# Patient Record
Sex: FEMALE | Race: WHITE | NOT HISPANIC OR LATINO | Employment: OTHER | URBAN - METROPOLITAN AREA
[De-identification: names, ages, dates, MRNs, and addresses within clinical notes are randomized per-mention and may not be internally consistent; named-entity substitution may affect disease eponyms.]

---

## 2019-08-19 ENCOUNTER — HOSPITAL ENCOUNTER (INPATIENT)
Facility: MEDICAL CENTER | Age: 69
LOS: 1 days | DRG: 247 | End: 2019-08-21
Attending: EMERGENCY MEDICINE | Admitting: INTERNAL MEDICINE
Payer: MEDICARE

## 2019-08-19 ENCOUNTER — APPOINTMENT (OUTPATIENT)
Dept: RADIOLOGY | Facility: MEDICAL CENTER | Age: 69
DRG: 247 | End: 2019-08-19
Attending: EMERGENCY MEDICINE
Payer: MEDICARE

## 2019-08-19 ENCOUNTER — APPOINTMENT (OUTPATIENT)
Dept: RADIOLOGY | Facility: MEDICAL CENTER | Age: 69
DRG: 247 | End: 2019-08-19
Payer: MEDICARE

## 2019-08-19 DIAGNOSIS — I25.10 CORONARY ARTERY DISEASE INVOLVING NATIVE CORONARY ARTERY OF NATIVE HEART, ANGINA PRESENCE UNSPECIFIED: ICD-10-CM

## 2019-08-19 DIAGNOSIS — I21.4 NSTEMI (NON-ST ELEVATED MYOCARDIAL INFARCTION) (HCC): ICD-10-CM

## 2019-08-19 DIAGNOSIS — I20.0 UNSTABLE ANGINA PECTORIS (HCC): ICD-10-CM

## 2019-08-19 PROBLEM — R71.8 HIGH HEMATOCRIT: Status: ACTIVE | Noted: 2019-08-19

## 2019-08-19 PROBLEM — F17.200 TOBACCO USE DISORDER: Status: ACTIVE | Noted: 2019-08-19

## 2019-08-19 PROBLEM — R07.9 CHEST PAIN: Status: ACTIVE | Noted: 2019-08-19

## 2019-08-19 PROBLEM — R79.89 ELEVATED TROPONIN LEVEL: Status: ACTIVE | Noted: 2019-08-19

## 2019-08-19 LAB
ALBUMIN SERPL BCP-MCNC: 4.7 G/DL (ref 3.2–4.9)
ALBUMIN/GLOB SERPL: 1.7 G/DL
ALP SERPL-CCNC: 58 U/L (ref 30–99)
ALT SERPL-CCNC: 21 U/L (ref 2–50)
ANION GAP SERPL CALC-SCNC: 9 MMOL/L (ref 0–11.9)
AST SERPL-CCNC: 18 U/L (ref 12–45)
BASOPHILS # BLD AUTO: 0.4 % (ref 0–1.8)
BASOPHILS # BLD: 0.03 K/UL (ref 0–0.12)
BILIRUB SERPL-MCNC: 0.8 MG/DL (ref 0.1–1.5)
BUN SERPL-MCNC: 13 MG/DL (ref 8–22)
CALCIUM SERPL-MCNC: 10.1 MG/DL (ref 8.5–10.5)
CHLORIDE SERPL-SCNC: 106 MMOL/L (ref 96–112)
CO2 SERPL-SCNC: 27 MMOL/L (ref 20–33)
CREAT SERPL-MCNC: 0.84 MG/DL (ref 0.5–1.4)
EKG IMPRESSION: NORMAL
EOSINOPHIL # BLD AUTO: 0.08 K/UL (ref 0–0.51)
EOSINOPHIL NFR BLD: 1 % (ref 0–6.9)
ERYTHROCYTE [DISTWIDTH] IN BLOOD BY AUTOMATED COUNT: 42.6 FL (ref 35.9–50)
GLOBULIN SER CALC-MCNC: 2.7 G/DL (ref 1.9–3.5)
GLUCOSE SERPL-MCNC: 93 MG/DL (ref 65–99)
HCT VFR BLD AUTO: 48.4 % (ref 37–47)
HGB BLD-MCNC: 15.5 G/DL (ref 12–16)
IMM GRANULOCYTES # BLD AUTO: 0.02 K/UL (ref 0–0.11)
IMM GRANULOCYTES NFR BLD AUTO: 0.2 % (ref 0–0.9)
LYMPHOCYTES # BLD AUTO: 2.78 K/UL (ref 1–4.8)
LYMPHOCYTES NFR BLD: 33.6 % (ref 22–41)
MCH RBC QN AUTO: 28.3 PG (ref 27–33)
MCHC RBC AUTO-ENTMCNC: 32 G/DL (ref 33.6–35)
MCV RBC AUTO: 88.3 FL (ref 81.4–97.8)
MONOCYTES # BLD AUTO: 0.44 K/UL (ref 0–0.85)
MONOCYTES NFR BLD AUTO: 5.3 % (ref 0–13.4)
NEUTROPHILS # BLD AUTO: 4.92 K/UL (ref 2–7.15)
NEUTROPHILS NFR BLD: 59.5 % (ref 44–72)
NRBC # BLD AUTO: 0 K/UL
NRBC BLD-RTO: 0 /100 WBC
PLATELET # BLD AUTO: 241 K/UL (ref 164–446)
PMV BLD AUTO: 9.7 FL (ref 9–12.9)
POTASSIUM SERPL-SCNC: 3.8 MMOL/L (ref 3.6–5.5)
PROT SERPL-MCNC: 7.4 G/DL (ref 6–8.2)
RBC # BLD AUTO: 5.48 M/UL (ref 4.2–5.4)
SODIUM SERPL-SCNC: 142 MMOL/L (ref 135–145)
TROPONIN T SERPL-MCNC: 48 NG/L (ref 6–19)
TROPONIN T SERPL-MCNC: <6 NG/L (ref 6–19)
WBC # BLD AUTO: 8.3 K/UL (ref 4.8–10.8)

## 2019-08-19 PROCEDURE — 93005 ELECTROCARDIOGRAM TRACING: CPT | Performed by: STUDENT IN AN ORGANIZED HEALTH CARE EDUCATION/TRAINING PROGRAM

## 2019-08-19 PROCEDURE — 96374 THER/PROPH/DIAG INJ IV PUSH: CPT

## 2019-08-19 PROCEDURE — 80053 COMPREHEN METABOLIC PANEL: CPT

## 2019-08-19 PROCEDURE — G0378 HOSPITAL OBSERVATION PER HR: HCPCS

## 2019-08-19 PROCEDURE — 93005 ELECTROCARDIOGRAM TRACING: CPT | Performed by: EMERGENCY MEDICINE

## 2019-08-19 PROCEDURE — 84484 ASSAY OF TROPONIN QUANT: CPT | Mod: 91

## 2019-08-19 PROCEDURE — 700111 HCHG RX REV CODE 636 W/ 250 OVERRIDE (IP): Performed by: STUDENT IN AN ORGANIZED HEALTH CARE EDUCATION/TRAINING PROGRAM

## 2019-08-19 PROCEDURE — 85025 COMPLETE CBC W/AUTO DIFF WBC: CPT

## 2019-08-19 PROCEDURE — 71045 X-RAY EXAM CHEST 1 VIEW: CPT

## 2019-08-19 PROCEDURE — 96372 THER/PROPH/DIAG INJ SC/IM: CPT

## 2019-08-19 PROCEDURE — 700102 HCHG RX REV CODE 250 W/ 637 OVERRIDE(OP): Performed by: STUDENT IN AN ORGANIZED HEALTH CARE EDUCATION/TRAINING PROGRAM

## 2019-08-19 PROCEDURE — 93005 ELECTROCARDIOGRAM TRACING: CPT

## 2019-08-19 PROCEDURE — 36415 COLL VENOUS BLD VENIPUNCTURE: CPT

## 2019-08-19 PROCEDURE — 700102 HCHG RX REV CODE 250 W/ 637 OVERRIDE(OP): Performed by: EMERGENCY MEDICINE

## 2019-08-19 PROCEDURE — 99285 EMERGENCY DEPT VISIT HI MDM: CPT

## 2019-08-19 PROCEDURE — 700111 HCHG RX REV CODE 636 W/ 250 OVERRIDE (IP): Performed by: EMERGENCY MEDICINE

## 2019-08-19 PROCEDURE — 93010 ELECTROCARDIOGRAM REPORT: CPT | Performed by: INTERNAL MEDICINE

## 2019-08-19 PROCEDURE — A9270 NON-COVERED ITEM OR SERVICE: HCPCS | Performed by: EMERGENCY MEDICINE

## 2019-08-19 PROCEDURE — A9270 NON-COVERED ITEM OR SERVICE: HCPCS | Performed by: STUDENT IN AN ORGANIZED HEALTH CARE EDUCATION/TRAINING PROGRAM

## 2019-08-19 RX ORDER — ACETAMINOPHEN 500 MG
500 TABLET ORAL EVERY 6 HOURS PRN
Status: DISCONTINUED | OUTPATIENT
Start: 2019-08-19 | End: 2019-08-21 | Stop reason: HOSPADM

## 2019-08-19 RX ORDER — POLYETHYLENE GLYCOL 3350 17 G/17G
1 POWDER, FOR SOLUTION ORAL
Status: DISCONTINUED | OUTPATIENT
Start: 2019-08-19 | End: 2019-08-21 | Stop reason: HOSPADM

## 2019-08-19 RX ORDER — NITROGLYCERIN 0.4 MG/1
0.4 TABLET SUBLINGUAL
COMMUNITY

## 2019-08-19 RX ORDER — LOSARTAN POTASSIUM AND HYDROCHLOROTHIAZIDE 12.5; 1 MG/1; MG/1
1 TABLET ORAL DAILY
Status: DISCONTINUED | OUTPATIENT
Start: 2019-08-20 | End: 2019-08-19

## 2019-08-19 RX ORDER — AMOXICILLIN 250 MG
2 CAPSULE ORAL 2 TIMES DAILY
Status: DISCONTINUED | OUTPATIENT
Start: 2019-08-19 | End: 2019-08-21 | Stop reason: HOSPADM

## 2019-08-19 RX ORDER — ONDANSETRON 2 MG/ML
4 INJECTION INTRAMUSCULAR; INTRAVENOUS ONCE
Status: COMPLETED | OUTPATIENT
Start: 2019-08-19 | End: 2019-08-19

## 2019-08-19 RX ORDER — BISACODYL 10 MG
10 SUPPOSITORY, RECTAL RECTAL
Status: DISCONTINUED | OUTPATIENT
Start: 2019-08-19 | End: 2019-08-21 | Stop reason: HOSPADM

## 2019-08-19 RX ORDER — ROSUVASTATIN CALCIUM 20 MG/1
20 TABLET, COATED ORAL EVERY EVENING
COMMUNITY

## 2019-08-19 RX ORDER — HYDROCHLOROTHIAZIDE 25 MG/1
12.5 TABLET ORAL
Status: DISCONTINUED | OUTPATIENT
Start: 2019-08-20 | End: 2019-08-21 | Stop reason: HOSPADM

## 2019-08-19 RX ORDER — LOSARTAN POTASSIUM AND HYDROCHLOROTHIAZIDE 12.5; 1 MG/1; MG/1
1 TABLET ORAL DAILY
COMMUNITY

## 2019-08-19 RX ORDER — LABETALOL HYDROCHLORIDE 5 MG/ML
10 INJECTION, SOLUTION INTRAVENOUS EVERY 4 HOURS PRN
Status: DISCONTINUED | OUTPATIENT
Start: 2019-08-19 | End: 2019-08-21 | Stop reason: HOSPADM

## 2019-08-19 RX ORDER — MULTIVITAMIN WITH IRON
250 TABLET ORAL
COMMUNITY

## 2019-08-19 RX ORDER — ROSUVASTATIN CALCIUM 20 MG/1
20 TABLET, COATED ORAL EVERY EVENING
Status: DISCONTINUED | OUTPATIENT
Start: 2019-08-19 | End: 2019-08-21 | Stop reason: HOSPADM

## 2019-08-19 RX ORDER — LOSARTAN POTASSIUM 50 MG/1
100 TABLET ORAL
Status: DISCONTINUED | OUTPATIENT
Start: 2019-08-20 | End: 2019-08-21 | Stop reason: HOSPADM

## 2019-08-19 RX ORDER — METOPROLOL SUCCINATE 25 MG/1
12.5 TABLET, EXTENDED RELEASE ORAL 2 TIMES DAILY
COMMUNITY
End: 2019-08-19

## 2019-08-19 RX ADMIN — ONDANSETRON 4 MG: 2 INJECTION INTRAMUSCULAR; INTRAVENOUS at 18:42

## 2019-08-19 RX ADMIN — NITROGLYCERIN 1 INCH: 20 OINTMENT TOPICAL at 18:42

## 2019-08-19 RX ADMIN — ROSUVASTATIN CALCIUM 20 MG: 20 TABLET, FILM COATED ORAL at 21:03

## 2019-08-19 RX ADMIN — ENOXAPARIN SODIUM 100 MG: 100 INJECTION SUBCUTANEOUS at 23:23

## 2019-08-19 RX ADMIN — METOPROLOL TARTRATE 12.5 MG: 25 TABLET ORAL at 21:03

## 2019-08-19 ASSESSMENT — LIFESTYLE VARIABLES
HOW MANY TIMES IN THE PAST YEAR HAVE YOU HAD 5 OR MORE DRINKS IN A DAY: 0
AVERAGE NUMBER OF DAYS PER WEEK YOU HAVE A DRINK CONTAINING ALCOHOL: 0
EVER HAD A DRINK FIRST THING IN THE MORNING TO STEADY YOUR NERVES TO GET RID OF A HANGOVER: NO
EVER FELT BAD OR GUILTY ABOUT YOUR DRINKING: NO
ALCOHOL_USE: NO
HAVE YOU EVER FELT YOU SHOULD CUT DOWN ON YOUR DRINKING: NO
CONSUMPTION TOTAL: NEGATIVE
TOTAL SCORE: 0
DOES PATIENT WANT TO STOP DRINKING: NO
DO YOU DRINK ALCOHOL: NO
TOTAL SCORE: 0
TOTAL SCORE: 0
ON A TYPICAL DAY WHEN YOU DRINK ALCOHOL HOW MANY DRINKS DO YOU HAVE: 0
HAVE PEOPLE ANNOYED YOU BY CRITICIZING YOUR DRINKING: NO
EVER_SMOKED: YES

## 2019-08-19 ASSESSMENT — ENCOUNTER SYMPTOMS
CLAUDICATION: 0
PSYCHIATRIC NEGATIVE: 1
RESPIRATORY NEGATIVE: 1
CONSTITUTIONAL NEGATIVE: 1
MUSCULOSKELETAL NEGATIVE: 1
PND: 0
NEUROLOGICAL NEGATIVE: 1
GASTROINTESTINAL NEGATIVE: 1
ORTHOPNEA: 0

## 2019-08-19 ASSESSMENT — PATIENT HEALTH QUESTIONNAIRE - PHQ9
SUM OF ALL RESPONSES TO PHQ9 QUESTIONS 1 AND 2: 0
2. FEELING DOWN, DEPRESSED, IRRITABLE, OR HOPELESS: NOT AT ALL
1. LITTLE INTEREST OR PLEASURE IN DOING THINGS: NOT AT ALL

## 2019-08-19 ASSESSMENT — PAIN SCALES - WONG BAKER
WONGBAKER_NUMERICALRESPONSE: HURTS JUST A LITTLE BIT
WONGBAKER_NUMERICALRESPONSE: DOESN'T HURT AT ALL

## 2019-08-19 NOTE — ED TRIAGE NOTES
"Chief Complaint   Patient presents with   • Chest Pain     \"I am having a heart attack, I know I am\". yesterday at 0530 am developed LT sided chest pain raidating down both arms.      Pt to triage for above. Pt initially crying, consoled pt. Now calmed down. Protocol ordered.  Took 81mg ASA this am and NTG x2 PTA.    Educated on triage process and to inform staff of any changes.     BP (!) 187/94   Pulse 71   Temp 36.6 °C (97.9 °F) (Temporal)   Resp 18   Ht 1.753 m (5' 9\")   Wt 105.3 kg (232 lb 2.3 oz)   SpO2 98%   BMI 34.28 kg/m²     "

## 2019-08-19 NOTE — ED NOTES
V/s reassessed.  Pt back to waiting room.  Pt instructed to inform RN if any changes or questions arise.

## 2019-08-20 ENCOUNTER — APPOINTMENT (OUTPATIENT)
Dept: CARDIOLOGY | Facility: MEDICAL CENTER | Age: 69
DRG: 247 | End: 2019-08-20
Attending: INTERNAL MEDICINE
Payer: MEDICARE

## 2019-08-20 ENCOUNTER — PATIENT OUTREACH (OUTPATIENT)
Dept: HEALTH INFORMATION MANAGEMENT | Facility: OTHER | Age: 69
End: 2019-08-20

## 2019-08-20 PROBLEM — I21.4 NSTEMI (NON-ST ELEVATED MYOCARDIAL INFARCTION) (HCC): Status: ACTIVE | Noted: 2019-08-19

## 2019-08-20 PROBLEM — I10 HYPERTENSION: Status: ACTIVE | Noted: 2019-08-20

## 2019-08-20 LAB
ACT BLD: 241 SEC (ref 74–137)
ACT BLD: 241 SEC (ref 74–137)
ALBUMIN SERPL BCP-MCNC: 3.8 G/DL (ref 3.2–4.9)
ALBUMIN/GLOB SERPL: 1.7 G/DL
ALP SERPL-CCNC: 51 U/L (ref 30–99)
ALT SERPL-CCNC: 17 U/L (ref 2–50)
ANION GAP SERPL CALC-SCNC: 7 MMOL/L (ref 0–11.9)
AST SERPL-CCNC: 22 U/L (ref 12–45)
BASOPHILS # BLD AUTO: 0.3 % (ref 0–1.8)
BASOPHILS # BLD: 0.02 K/UL (ref 0–0.12)
BILIRUB SERPL-MCNC: 0.8 MG/DL (ref 0.1–1.5)
BUN SERPL-MCNC: 15 MG/DL (ref 8–22)
CALCIUM SERPL-MCNC: 9.3 MG/DL (ref 8.5–10.5)
CHLORIDE SERPL-SCNC: 107 MMOL/L (ref 96–112)
CHOLEST SERPL-MCNC: 112 MG/DL (ref 100–199)
CO2 SERPL-SCNC: 28 MMOL/L (ref 20–33)
CREAT SERPL-MCNC: 0.8 MG/DL (ref 0.5–1.4)
EKG IMPRESSION: NORMAL
EOSINOPHIL # BLD AUTO: 0.13 K/UL (ref 0–0.51)
EOSINOPHIL NFR BLD: 2 % (ref 0–6.9)
ERYTHROCYTE [DISTWIDTH] IN BLOOD BY AUTOMATED COUNT: 42.9 FL (ref 35.9–50)
GLOBULIN SER CALC-MCNC: 2.3 G/DL (ref 1.9–3.5)
GLUCOSE SERPL-MCNC: 88 MG/DL (ref 65–99)
HCT VFR BLD AUTO: 43.4 % (ref 37–47)
HDLC SERPL-MCNC: 46 MG/DL
HGB BLD-MCNC: 13.8 G/DL (ref 12–16)
IMM GRANULOCYTES # BLD AUTO: 0.02 K/UL (ref 0–0.11)
IMM GRANULOCYTES NFR BLD AUTO: 0.3 % (ref 0–0.9)
LDLC SERPL CALC-MCNC: 56 MG/DL
LYMPHOCYTES # BLD AUTO: 2.89 K/UL (ref 1–4.8)
LYMPHOCYTES NFR BLD: 44.3 % (ref 22–41)
MAGNESIUM SERPL-MCNC: 2.1 MG/DL (ref 1.5–2.5)
MCH RBC QN AUTO: 28.5 PG (ref 27–33)
MCHC RBC AUTO-ENTMCNC: 31.8 G/DL (ref 33.6–35)
MCV RBC AUTO: 89.5 FL (ref 81.4–97.8)
MONOCYTES # BLD AUTO: 0.51 K/UL (ref 0–0.85)
MONOCYTES NFR BLD AUTO: 7.8 % (ref 0–13.4)
NEUTROPHILS # BLD AUTO: 2.95 K/UL (ref 2–7.15)
NEUTROPHILS NFR BLD: 45.3 % (ref 44–72)
NRBC # BLD AUTO: 0 K/UL
NRBC BLD-RTO: 0 /100 WBC
PHOSPHATE SERPL-MCNC: 3.6 MG/DL (ref 2.5–4.5)
PLATELET # BLD AUTO: 206 K/UL (ref 164–446)
PMV BLD AUTO: 9.8 FL (ref 9–12.9)
POTASSIUM SERPL-SCNC: 3.6 MMOL/L (ref 3.6–5.5)
PROT SERPL-MCNC: 6.1 G/DL (ref 6–8.2)
RBC # BLD AUTO: 4.85 M/UL (ref 4.2–5.4)
SODIUM SERPL-SCNC: 142 MMOL/L (ref 135–145)
TRIGL SERPL-MCNC: 48 MG/DL (ref 0–149)
TROPONIN T SERPL-MCNC: 164 NG/L (ref 6–19)
WBC # BLD AUTO: 6.5 K/UL (ref 4.8–10.8)

## 2019-08-20 PROCEDURE — 027034Z DILATION OF CORONARY ARTERY, ONE ARTERY WITH DRUG-ELUTING INTRALUMINAL DEVICE, PERCUTANEOUS APPROACH: ICD-10-PCS | Performed by: INTERNAL MEDICINE

## 2019-08-20 PROCEDURE — 84484 ASSAY OF TROPONIN QUANT: CPT

## 2019-08-20 PROCEDURE — 700105 HCHG RX REV CODE 258: Performed by: INTERNAL MEDICINE

## 2019-08-20 PROCEDURE — 36415 COLL VENOUS BLD VENIPUNCTURE: CPT

## 2019-08-20 PROCEDURE — 99223 1ST HOSP IP/OBS HIGH 75: CPT | Mod: 25 | Performed by: INTERNAL MEDICINE

## 2019-08-20 PROCEDURE — 93458 L HRT ARTERY/VENTRICLE ANGIO: CPT | Mod: 26,59 | Performed by: INTERNAL MEDICINE

## 2019-08-20 PROCEDURE — A9270 NON-COVERED ITEM OR SERVICE: HCPCS | Performed by: STUDENT IN AN ORGANIZED HEALTH CARE EDUCATION/TRAINING PROGRAM

## 2019-08-20 PROCEDURE — 93010 ELECTROCARDIOGRAM REPORT: CPT | Performed by: INTERNAL MEDICINE

## 2019-08-20 PROCEDURE — 84100 ASSAY OF PHOSPHORUS: CPT

## 2019-08-20 PROCEDURE — 770020 HCHG ROOM/CARE - TELE (206)

## 2019-08-20 PROCEDURE — 80061 LIPID PANEL: CPT

## 2019-08-20 PROCEDURE — 80053 COMPREHEN METABOLIC PANEL: CPT

## 2019-08-20 PROCEDURE — 83735 ASSAY OF MAGNESIUM: CPT

## 2019-08-20 PROCEDURE — B2111ZZ FLUOROSCOPY OF MULTIPLE CORONARY ARTERIES USING LOW OSMOLAR CONTRAST: ICD-10-PCS | Performed by: INTERNAL MEDICINE

## 2019-08-20 PROCEDURE — 99152 MOD SED SAME PHYS/QHP 5/>YRS: CPT | Performed by: INTERNAL MEDICINE

## 2019-08-20 PROCEDURE — A9270 NON-COVERED ITEM OR SERVICE: HCPCS | Performed by: INTERNAL MEDICINE

## 2019-08-20 PROCEDURE — B2151ZZ FLUOROSCOPY OF LEFT HEART USING LOW OSMOLAR CONTRAST: ICD-10-PCS | Performed by: INTERNAL MEDICINE

## 2019-08-20 PROCEDURE — A9270 NON-COVERED ITEM OR SERVICE: HCPCS

## 2019-08-20 PROCEDURE — 85025 COMPLETE CBC W/AUTO DIFF WBC: CPT

## 2019-08-20 PROCEDURE — 700102 HCHG RX REV CODE 250 W/ 637 OVERRIDE(OP): Performed by: STUDENT IN AN ORGANIZED HEALTH CARE EDUCATION/TRAINING PROGRAM

## 2019-08-20 PROCEDURE — 700111 HCHG RX REV CODE 636 W/ 250 OVERRIDE (IP)

## 2019-08-20 PROCEDURE — 700102 HCHG RX REV CODE 250 W/ 637 OVERRIDE(OP)

## 2019-08-20 PROCEDURE — 700101 HCHG RX REV CODE 250

## 2019-08-20 PROCEDURE — 4A023N7 MEASUREMENT OF CARDIAC SAMPLING AND PRESSURE, LEFT HEART, PERCUTANEOUS APPROACH: ICD-10-PCS | Performed by: INTERNAL MEDICINE

## 2019-08-20 PROCEDURE — 700102 HCHG RX REV CODE 250 W/ 637 OVERRIDE(OP): Performed by: INTERNAL MEDICINE

## 2019-08-20 PROCEDURE — 700117 HCHG RX CONTRAST REV CODE 255: Performed by: INTERNAL MEDICINE

## 2019-08-20 PROCEDURE — 99153 MOD SED SAME PHYS/QHP EA: CPT

## 2019-08-20 PROCEDURE — 85347 COAGULATION TIME ACTIVATED: CPT | Mod: 91

## 2019-08-20 PROCEDURE — 93005 ELECTROCARDIOGRAM TRACING: CPT | Performed by: STUDENT IN AN ORGANIZED HEALTH CARE EDUCATION/TRAINING PROGRAM

## 2019-08-20 PROCEDURE — 92928 PRQ TCAT PLMT NTRAC ST 1 LES: CPT | Mod: LD | Performed by: INTERNAL MEDICINE

## 2019-08-20 PROCEDURE — 93005 ELECTROCARDIOGRAM TRACING: CPT | Performed by: INTERNAL MEDICINE

## 2019-08-20 RX ORDER — METOPROLOL SUCCINATE 50 MG/1
50 TABLET, EXTENDED RELEASE ORAL
Status: CANCELLED | OUTPATIENT
Start: 2019-08-20

## 2019-08-20 RX ORDER — HEPARIN SODIUM,PORCINE 1000/ML
VIAL (ML) INJECTION
Status: COMPLETED
Start: 2019-08-20 | End: 2019-08-20

## 2019-08-20 RX ORDER — MIDAZOLAM HYDROCHLORIDE 1 MG/ML
INJECTION INTRAMUSCULAR; INTRAVENOUS
Status: COMPLETED
Start: 2019-08-20 | End: 2019-08-20

## 2019-08-20 RX ORDER — LIDOCAINE HYDROCHLORIDE 20 MG/ML
INJECTION, SOLUTION INFILTRATION; PERINEURAL
Status: COMPLETED
Start: 2019-08-20 | End: 2019-08-20

## 2019-08-20 RX ORDER — HEPARIN SODIUM 200 [USP'U]/100ML
INJECTION, SOLUTION INTRAVENOUS
Status: COMPLETED
Start: 2019-08-20 | End: 2019-08-20

## 2019-08-20 RX ORDER — SODIUM CHLORIDE 9 MG/ML
INJECTION, SOLUTION INTRAVENOUS CONTINUOUS
Status: DISCONTINUED | OUTPATIENT
Start: 2019-08-20 | End: 2019-08-21

## 2019-08-20 RX ORDER — VERAPAMIL HYDROCHLORIDE 2.5 MG/ML
INJECTION, SOLUTION INTRAVENOUS
Status: COMPLETED
Start: 2019-08-20 | End: 2019-08-20

## 2019-08-20 RX ORDER — CLOPIDOGREL BISULFATE 75 MG/1
300 TABLET ORAL ONCE
Status: CANCELLED | OUTPATIENT
Start: 2019-08-20 | End: 2019-08-20

## 2019-08-20 RX ADMIN — METOPROLOL TARTRATE 25 MG: 25 TABLET, FILM COATED ORAL at 17:33

## 2019-08-20 RX ADMIN — VERAPAMIL HYDROCHLORIDE 2.5 MG: 2.5 INJECTION, SOLUTION INTRAVENOUS at 10:30

## 2019-08-20 RX ADMIN — MIDAZOLAM HYDROCHLORIDE 2 MG: 1 INJECTION, SOLUTION INTRAMUSCULAR; INTRAVENOUS at 10:39

## 2019-08-20 RX ADMIN — SODIUM CHLORIDE: 9 INJECTION, SOLUTION INTRAVENOUS at 06:44

## 2019-08-20 RX ADMIN — HYDROCHLOROTHIAZIDE 12.5 MG: 25 TABLET ORAL at 06:02

## 2019-08-20 RX ADMIN — METOPROLOL TARTRATE 12.5 MG: 25 TABLET ORAL at 06:03

## 2019-08-20 RX ADMIN — FENTANYL CITRATE 100 MCG: 50 INJECTION, SOLUTION INTRAMUSCULAR; INTRAVENOUS at 11:04

## 2019-08-20 RX ADMIN — NITROGLYCERIN 10 ML: 20 INJECTION INTRAVENOUS at 10:30

## 2019-08-20 RX ADMIN — ROSUVASTATIN CALCIUM 20 MG: 20 TABLET, FILM COATED ORAL at 17:33

## 2019-08-20 RX ADMIN — IOHEXOL 90 ML: 350 INJECTION, SOLUTION INTRAVENOUS at 11:22

## 2019-08-20 RX ADMIN — ASPIRIN 81 MG: 81 TABLET, COATED ORAL at 06:02

## 2019-08-20 RX ADMIN — HEPARIN SODIUM: 1000 INJECTION, SOLUTION INTRAVENOUS; SUBCUTANEOUS at 10:30

## 2019-08-20 RX ADMIN — HEPARIN SODIUM: 1000 INJECTION, SOLUTION INTRAVENOUS; SUBCUTANEOUS at 10:38

## 2019-08-20 RX ADMIN — LOSARTAN POTASSIUM 100 MG: 50 TABLET ORAL at 06:02

## 2019-08-20 RX ADMIN — TICAGRELOR 180 MG: 90 TABLET ORAL at 11:22

## 2019-08-20 RX ADMIN — HEPARIN SODIUM: 1000 INJECTION, SOLUTION INTRAVENOUS; SUBCUTANEOUS at 10:58

## 2019-08-20 RX ADMIN — NICOTINE 7 MG: 7 PATCH, EXTENDED RELEASE TRANSDERMAL at 06:03

## 2019-08-20 RX ADMIN — LIDOCAINE HYDROCHLORIDE: 20 INJECTION, SOLUTION INFILTRATION; PERINEURAL at 10:30

## 2019-08-20 RX ADMIN — SODIUM CHLORIDE: 9 INJECTION, SOLUTION INTRAVENOUS at 23:44

## 2019-08-20 RX ADMIN — HEPARIN SODIUM 2000 UNITS: 200 INJECTION, SOLUTION INTRAVENOUS at 10:30

## 2019-08-20 RX ADMIN — ACETAMINOPHEN 500 MG: 500 TABLET ORAL at 19:48

## 2019-08-20 ASSESSMENT — ENCOUNTER SYMPTOMS
CONSTIPATION: 0
BLOOD IN STOOL: 0
DIARRHEA: 0
DEPRESSION: 0
WHEEZING: 0
PALPITATIONS: 1
SORE THROAT: 0
DOUBLE VISION: 0
TREMORS: 0
FOCAL WEAKNESS: 0
DIZZINESS: 0
PHOTOPHOBIA: 0
NECK PAIN: 0
BLURRED VISION: 0
NERVOUS/ANXIOUS: 0
SHORTNESS OF BREATH: 0
NAUSEA: 0
CHILLS: 0
ABDOMINAL PAIN: 0
HEADACHES: 0
FEVER: 0
COUGH: 0
BRUISES/BLEEDS EASILY: 0
MYALGIAS: 0
VOMITING: 0

## 2019-08-20 ASSESSMENT — COGNITIVE AND FUNCTIONAL STATUS - GENERAL
SUGGESTED CMS G CODE MODIFIER DAILY ACTIVITY: CH
DAILY ACTIVITIY SCORE: 24
SUGGESTED CMS G CODE MODIFIER MOBILITY: CH
MOBILITY SCORE: 24

## 2019-08-20 ASSESSMENT — PATIENT HEALTH QUESTIONNAIRE - PHQ9
SUM OF ALL RESPONSES TO PHQ9 QUESTIONS 1 AND 2: 0
1. LITTLE INTEREST OR PLEASURE IN DOING THINGS: NOT AT ALL
2. FEELING DOWN, DEPRESSED, IRRITABLE, OR HOPELESS: NOT AT ALL

## 2019-08-20 ASSESSMENT — PAIN SCALES - WONG BAKER: WONGBAKER_NUMERICALRESPONSE: DOESN'T HURT AT ALL

## 2019-08-20 NOTE — H&P
Internal Medicine Admitting History and Physical    Note Author: Conner Powers M.D.       Name Carolina Hargrove     1950   Age/Sex 69 y.o. female   MRN [id number]   Code Status DNAR/DNI     After 5PM or if no immediate response to page, please call for cross-coverage  Attending/Team: Dr. Weinberg/Beena See Patient List for primary contact information  Call [phone] to page    1st Call - Day Intern (R1):   Dr. Fernandez 2nd Call - Day Sr. Resident (R2/R3):   Dr. Calvo       Chief Complaint:   Substernal chest pain at rest w/ radiation to b/l arms    HPI:  Mrs Hargrove is a 70 y/o F w/ CAD and hx of MI s/p stent 3x of LCx, R. Prox1, prox2 2015 on ASA, current smoker admitted here for chest pain.    Pt states  5:30AM when she woke up feeling this sharp substernal chest pain that radiates to her left and right arms. Then this morning the pt then felt the same chest pain with radiation to her left and right arms. She also experienced sweating but denies palpitations. Pt then went to the ED for treatment since pt had the same signs of symptoms when she had MI in 2015 requiring stent.     At the ED pt's VS: /72, HR 63, T 36.6, RR 18. Troponin was <6, EKG showing sinus bradycardia with pathologic qwave at II, III, aVF. Ca 10.1. A repeat Troponin after 6hrs showed same pathologic qwave at II, III, aVF. Pt still stable at this time. Pt was put on enoxaparin for anticoagulation, still continuing to trend troponin.     Pt on bedside interview, pt states she was having stressors in her life.  Pt states she had hx of palpitations, and dyspnea at rest. Pt denies b/l leg edema, orthopnea, PND.       Review of Systems   Constitutional: Negative.    HENT: Negative.    Respiratory: Negative.    Cardiovascular: Positive for chest pain. Negative for orthopnea, claudication, leg swelling and PND.        Hx of palpitations   Gastrointestinal: Negative.    Genitourinary: Negative.    Musculoskeletal: Negative.     Skin: Negative.    Neurological: Negative.    Endo/Heme/Allergies: Negative.    Psychiatric/Behavioral: Negative.              Past Medical History (Chronic medical problem, known complications and current treatment)        Past Surgical History:  History reviewed. No pertinent surgical history.    Current Outpatient Medications:  Home Medications     Reviewed by Majo Linn (Pharmacy Tech) on 08/19/19 at 1855  Med List Status: Complete   Medication Last Dose Status   aspirin EC (ECOTRIN) 81 MG Tablet Delayed Response 8/19/2019 Active   losartan-hydrochlorothiazide (HYZAAR) 100-12.5 MG per tablet 8/19/2019 Active   Magnesium 250 MG Tab 8/19/2019 Active   metoprolol (LOPRESSOR) 25 MG Tab 8/19/2019 Active   nitroglycerin (NITROSTAT) 0.4 MG SL Tab 8/19/2019 Active   rosuvastatin (CRESTOR) 20 MG Tab 8/18/2019 Active                Medication Allergy/Sensitivities:  Allergies   Allergen Reactions   • Pcn [Penicillins]      rash         Family History (mandatory)   History reviewed. No pertinent family history.    Social History (mandatory)   Social History     Socioeconomic History   • Marital status:      Spouse name: Not on file   • Number of children: Not on file   • Years of education: Not on file   • Highest education level: Not on file   Occupational History   • Not on file   Social Needs   • Financial resource strain: Not on file   • Food insecurity:     Worry: Not on file     Inability: Not on file   • Transportation needs:     Medical: Not on file     Non-medical: Not on file   Tobacco Use   • Smoking status: Current Every Day Smoker     Types: Cigarettes   • Smokeless tobacco: Never Used   Substance and Sexual Activity   • Alcohol use: Not Currently   • Drug use: Not Currently   • Sexual activity: Not on file   Lifestyle   • Physical activity:     Days per week: Not on file     Minutes per session: Not on file   • Stress: Not on file   Relationships   • Social connections:     Talks on phone: Not  "on file     Gets together: Not on file     Attends [employer] service: Not on file     Active member of club or organization: Not on file     Attends meetings of clubs or organizations: Not on file     Relationship status: Not on file   • Intimate partner violence:     Fear of current or ex partner: Not on file     Emotionally abused: Not on file     Physically abused: Not on file     Forced sexual activity: Not on file   Other Topics Concern   • Not on file   Social History Narrative   • Not on file     PCP : No primary care provider on file.    Physical Exam     Vitals:    08/19/19 2001 08/19/19 2043 08/19/19 2358 08/20/19 0359   BP: (!) 171/73 153/73 145/65 150/71   Pulse: 77 69 65 67   Resp:  18 16 16   Temp:  36.7 °C (98.1 °F) 36.8 °C (98.3 °F) 36.2 °C (97.1 °F)   TempSrc:  Temporal Temporal Temporal   SpO2: 95% 92% 97% 97%   Weight:  104.1 kg (229 lb 8 oz)     Height:  1.727 m (5' 8\")       Body mass index is 34.9 kg/m².  O2 therapy: Pulse Oximetry: 97 %, O2 (LPM): 2, O2 Delivery: Nasal Cannula    Physical Exam   Constitutional:   Not in acute distress. Appears stated age   Cardiovascular:   Borderline bradycardic and rhythm, intact radial pulses. No r/g but w/ II/VI systolic murmur.    Pulmonary/Chest:   Clear breath sounds b/l, no wheezing no rales and no chest tenderness   Abdominal: Soft. There is no tenderness. There is no rebound.   Musculoskeletal: She exhibits no edema.   Skin: Skin is warm.   Psychiatric: Mood and judgment normal.         Data Review       Old Records Request:   Completed  Current Records review/summary: Completed    Lab Data Review:  Recent Results (from the past 24 hour(s))   EKG (NOW)    Collection Time: 08/19/19 10:57 AM   Result Value Ref Range    Report       [facility]    Test Date:  2019-08-19  Pt Name:    ANASTACIO CONNOR                Department: ER  MRN:        [id number]                      Room:        15  Gender:     Female                "        Technician: [id number]  :        1950                   Requested By:ER TRIAGE PROTOCOL  Order #:    [id number]                    Reading MD: PATRICK EUCEDA MD    Measurements  Intervals                                Axis  Rate:       59                           P:          49  NH:         156                          QRS:        15  QRSD:       90                           T:          17  QT:         440  QTc:        436    Interpretive Statements  SINUS BRADYCARDIA  BORDERLINE INFERIOR Q WAVES  BASELINE WANDER IN LEAD(S) V6  No previous ECG available for comparison    Electronically Signed On 2019 18:07:55 PDT by PATRICK EUCEDA MD     CBC with Differential    Collection Time: 19 12:33 PM   Result Value Ref Range    WBC 8.3 4.8 - 10.8 K/uL    RBC 5.48 (H) 4.20 - 5.40 M/uL    Hemoglobin 15.5 12.0 - 16.0 g/dL    Hematocrit 48.4 (H) 37.0 - 47.0 %    MCV 88.3 81.4 - 97.8 fL    MCH 28.3 27.0 - 33.0 pg    MCHC 32.0 (L) 33.6 - 35.0 g/dL    RDW 42.6 35.9 - 50.0 fL    Platelet Count 241 164 - 446 K/uL    MPV 9.7 9.0 - 12.9 fL    Neutrophils-Polys 59.50 44.00 - 72.00 %    Lymphocytes 33.60 22.00 - 41.00 %    Monocytes 5.30 0.00 - 13.40 %    Eosinophils 1.00 0.00 - 6.90 %    Basophils 0.40 0.00 - 1.80 %    Immature Granulocytes 0.20 0.00 - 0.90 %    Nucleated RBC 0.00 /100 WBC    Neutrophils (Absolute) 4.92 2.00 - 7.15 K/uL    Lymphs (Absolute) 2.78 1.00 - 4.80 K/uL    Monos (Absolute) 0.44 0.00 - 0.85 K/uL    Eos (Absolute) 0.08 0.00 - 0.51 K/uL    Baso (Absolute) 0.03 0.00 - 0.12 K/uL    Immature Granulocytes (abs) 0.02 0.00 - 0.11 K/uL    NRBC (Absolute) 0.00 K/uL   Complete Metabolic Panel (CMP)    Collection Time: 19 12:33 PM   Result Value Ref Range    Sodium 142 135 - 145 mmol/L    Potassium 3.8 3.6 - 5.5 mmol/L    Chloride 106 96 - 112 mmol/L    Co2 27 20 - 33 mmol/L    Anion Gap 9.0 0.0 - 11.9    Glucose 93 65 - 99 mg/dL    Bun 13 8 - 22 mg/dL    Creatinine 0.84 0.50 - 1.40  mg/dL    Calcium 10.1 8.5 - 10.5 mg/dL    AST(SGOT) 18 12 - 45 U/L    ALT(SGPT) 21 2 - 50 U/L    Alkaline Phosphatase 58 30 - 99 U/L    Total Bilirubin 0.8 0.1 - 1.5 mg/dL    Albumin 4.7 3.2 - 4.9 g/dL    Total Protein 7.4 6.0 - 8.2 g/dL    Globulin 2.7 1.9 - 3.5 g/dL    A-G Ratio 1.7 g/dL   Troponin    Collection Time: 19 12:33 PM   Result Value Ref Range    Troponin T <6 6 - 19 ng/L   ESTIMATED GFR    Collection Time: 19 12:33 PM   Result Value Ref Range    GFR If African American >60 >60 mL/min/1.73 m 2    GFR If Non African American >60 >60 mL/min/1.73 m 2   TROPONIN    Collection Time: 19  9:05 PM   Result Value Ref Range    Troponin T 48 (H) 6 - 19 ng/L   EKG    Collection Time: 19  9:55 PM   Result Value Ref Range    Report       [facility]    Test Date:  2019  Pt Name:    ANASTACIO CONNOR                Department: CPU  MRN:        [id number]                      Room:       T215  Gender:     Female                       Technician: DELFINO  :        1950                   Requested By:NELLIE COOLEY  Order #:    [id number]                    Reading MD:    Measurements  Intervals                                Axis  Rate:       53                           P:          52  PA:         161                          QRS:        11  QRSD:       103                          T:          17  QT:         458  QTc:        430    Interpretive Statements  SINUS BRADYCARDIA  RSR' IN V1 OR V2, RIGHT VCD OR RVH  INFERIOR INFARCT, OLD  Compared to ECG 2019 10:57:33  Right ventricular hypertrophy now present  RSR' in V1 or V2 now present  Myocardial infarct finding now present     CBC with Differential    Collection Time: 19  4:15 AM   Result Value Ref Range    WBC 6.5 4.8 - 10.8 K/uL    RBC 4.85 4.20 - 5.40 M/uL    Hemoglobin 13.8 12.0 - 16.0 g/dL    Hematocrit 43.4 37.0 - 47.0 %    MCV 89.5 81.4 - 97.8 fL    MCH 28.5 27.0 - 33.0 pg    MCHC 31.8 (L) 33.6 - 35.0  g/dL    RDW 42.9 35.9 - 50.0 fL    Platelet Count 206 164 - 446 K/uL    MPV 9.8 9.0 - 12.9 fL    Neutrophils-Polys 45.30 44.00 - 72.00 %    Lymphocytes 44.30 (H) 22.00 - 41.00 %    Monocytes 7.80 0.00 - 13.40 %    Eosinophils 2.00 0.00 - 6.90 %    Basophils 0.30 0.00 - 1.80 %    Immature Granulocytes 0.30 0.00 - 0.90 %    Nucleated RBC 0.00 /100 WBC    Neutrophils (Absolute) 2.95 2.00 - 7.15 K/uL    Lymphs (Absolute) 2.89 1.00 - 4.80 K/uL    Monos (Absolute) 0.51 0.00 - 0.85 K/uL    Eos (Absolute) 0.13 0.00 - 0.51 K/uL    Baso (Absolute) 0.02 0.00 - 0.12 K/uL    Immature Granulocytes (abs) 0.02 0.00 - 0.11 K/uL    NRBC (Absolute) 0.00 K/uL   Comp Metabolic Panel (CMP)    Collection Time: 19  4:15 AM   Result Value Ref Range    Sodium 142 135 - 145 mmol/L    Potassium 3.6 3.6 - 5.5 mmol/L    Chloride 107 96 - 112 mmol/L    Co2 28 20 - 33 mmol/L    Anion Gap 7.0 0.0 - 11.9    Glucose 88 65 - 99 mg/dL    Bun 15 8 - 22 mg/dL    Creatinine 0.80 0.50 - 1.40 mg/dL    Calcium 9.3 8.5 - 10.5 mg/dL    AST(SGOT) 22 12 - 45 U/L    ALT(SGPT) 17 2 - 50 U/L    Alkaline Phosphatase 51 30 - 99 U/L    Total Bilirubin 0.8 0.1 - 1.5 mg/dL    Albumin 3.8 3.2 - 4.9 g/dL    Total Protein 6.1 6.0 - 8.2 g/dL    Globulin 2.3 1.9 - 3.5 g/dL    A-G Ratio 1.7 g/dL   TROPONIN    Collection Time: 19  4:15 AM   Result Value Ref Range    Troponin T 164 (H) 6 - 19 ng/L   ESTIMATED GFR    Collection Time: 19  4:15 AM   Result Value Ref Range    GFR If African American >60 >60 mL/min/1.73 m 2    GFR If Non African American >60 >60 mL/min/1.73 m 2   EKG    Collection Time: 19  5:08 AM   Result Value Ref Range    Report       [facility]    Test Date:  2019  Pt Name:    ANASTACIO CONNOR                Department: CPU  MRN:        [id number]                      Room:       [facility]  Gender:     Female                       Technician: DELFINO  :        1950                   Requested By:NELLIE Suarez  #:    [id number]                    Reading MD:    Measurements  Intervals                                Axis  Rate:       63                           P:          54  RI:         159                          QRS:        36  QRSD:       121                          T:          24  QT:         445  QTc:        456    Interpretive Statements  SINUS RHYTHM  IVCD, CONSIDER ATYPICAL RBBB  Compared to ECG 08/19/2019 21:55:32  Sinus bradycardia no longer present  Right ventricular hypertrophy no longer present  Myocardial infarct finding no longer present         Imaging/Procedures Review:    Independant Imaging Review: Completed  DX-CHEST-PORTABLE (1 VIEW)   Final Result      No evidence of acute cardiopulmonary process.               EKG:   EKG Independent Review: Completed  QTc:436, HR: 60, Sinus bradycardia w/ pathologic qwave seen at II, III, aVF, no ST-T wave changes.     Records reviewed and summarized in current documentation :  Yes  [employer] teaching service handout given to patient:  Yes         Assessment/Plan     Chest pain NSTEMI  Assessment & Plan  HEART score of 4, hx of MI, CAD w/ stent, troponin bump of 48 from <6, and ECG changes showing pathological qwave. Currently pt is on lovenox, would consult cardiology team tomorrow for possible left heart catheter . Pt would like to do cardiology procedure outside [facility].     Update: Pt's troponin bumped up from <6 to 48 to 164. Called cardiology and would be evaluating patient.        Plan  Continue trending troponin  Continue home ASA  Continue home Metoprolol  Continue home Rosuvastatin  Started on Lovenox for anticoagulation.   Pt not feeling chest pain at this time so not requiring morphine or nitroglycerin.     Hx of MI CAD s/p stent 3x  Assessment & Plan  Pt has been compliant with medication mame ASA.     Plan  Continue home ASA  Continue home Metoprolol  Continue home Rosuvastatin  Continue home lisinopril-hctz    Hypertension  Assessment & Plan  BP is 150/71.  Uncontrolled.    Plan  Will continue home dose metoprolol 50mg; or may switch to carvidilol for better anti-HTN effects.   Will continue HCTZ 12.5  If BP above 180 will do hydralazine.     Tobacco use disorder  Assessment & Plan  Currently smoking.     Plan  Nicotine patch   for smoking cessation      High hematocrit  Assessment & Plan  Hct 48. Likely due to pt's current smoking. Can speculate this is EPO dependent.     Plan   for smoking cessation  Nicotine patch        Anticipated Hospital stay: Observation admit        Quality Measures  Quality-Core Measures   Reviewed items::  EKG reviewed, Labs reviewed, Medications reviewed and Radiology images reviewed  Carreno catheter::  No Carreno  DVT prophylaxis pharmacological::  Enoxaparin (Lovenox)    PCP: No primary care provider on file.

## 2019-08-20 NOTE — PROGRESS NOTES
For Tele transfer, bedside report done. Did call her daughter for update. Got all her belongings at bedside.

## 2019-08-20 NOTE — ASSESSMENT & PLAN NOTE
Previous MI in 2015 s/p stent x2 (RCA and LCx).   Presented 8/19 w/ typical chest pain but non-elevated troponin T  Initial EKG w/ pathological q-waves in inferior leads  Received full dose ASA, beta blocker, statin, oxygen, weight based lovenox on admission.   Troponin T elevated critically.   Evaluated by interventional cardiology.  Underwent left heart catheterization on 8/20, found to have significant stenosis of mLAD and nonobstructive LM, pLAD, and pLCx disease w/ patent LCx and RCA stents. Drug eluting stent placed in mLAD. LVEF noted to be 55%. Loaded w/ 180mg of Ticagrelor in the cath lab.     Plan  - Continue w/ DAPT for 1 year: ASA 81mg + Ticagrelor 90mg Daily  - Continue w/ rosuvastatin 20mg Daily  - Blood pressure control  - Smoking cessation counseling provided  - Outpatient cardiology follow up in 2 weeks

## 2019-08-20 NOTE — ASSESSMENT & PLAN NOTE
Hct 48. Likely due to pt's current smoking. Can speculate this is EPO dependent.   -Recommend smoking cessation

## 2019-08-20 NOTE — PROGRESS NOTES
Assumed patient care, NPO, denies chest pain. For Cath Lab today. Update plan of care. Sinus Rhythm on cardiac monitor.

## 2019-08-20 NOTE — ED NOTES
Pt ambulated to room. apologized to pt for wait time. Pt continues to have pain reports last took nitro at apx 1700

## 2019-08-20 NOTE — PROGRESS NOTES
Received bedside report from RN, pt care assumed, VSS, pt assessment complete. Pt AAOx4, no c/o  pain at this time. No signs of acute distress noted at this time. POC discussed with pt and verbalizes no questions. Pt denies any additional needs at this time. Bed in lowest position, bed alarm off, pt educated on fall risk and verbalized understanding, call light within reach, hourly rounding initiated. In a sinus rhythm. Right radial wrist post cath site is clean and dry.

## 2019-08-20 NOTE — SENIOR ADMIT NOTE
Senior Admission Note    In summary: Carolina Hargrove is a 69 y.o. female with past medical history of MI s/p stent placement x3, CAD, HTN that presented to the ER due to chest pain.     Patient reported that on 8/18 around 5am she woke up with chest pain, described a sharp substernal pain that radiated to both arms and jaw. Patient took nitroglycerin and pain improved. Then the morning of day of admission she felt the same pain. Again she tool nitroglycerin with improvement in symptoms, but the pain returned later and she decided to present to ED for evaluation. Patient reported that episodes were associated with sweating, but denied nausea, vomiting, palpitations or shortness of breath.       Assessment and plan in summary:    #Chest pain/NSTEMI  #History of MI in 2015   #CAD     - presented with left side chest pain with radiation to jaw and left arm. Pain at rest    - Previous MI in 2015 s/p stent placemen x3    - Pain improved after nitroglycerin   - initial troponin negative, but increased to 48    - HEART score 4/ ALBERT score 4   #Tobacco use    - current smoker     - Admitted telemetry   - trend troponin and EKG   - NPO at midnight   - Started weight base lovenox   - Consult cardiology in the morning for possible cardiac cath   - ASA, metoprolol, statin       For full plan, please see Intern note for details   Meaghan Murillo M.D.  PGY 3

## 2019-08-20 NOTE — ASSESSMENT & PLAN NOTE
Chronic  Found to have elevated LVEDP on cardiac cath    Plan:  - Decrease preload: home lopressor increased from 12.5mg BID to 25mg BID  - Continue w/ [place]/HCTZ  - Outpatient cardiology follow up in 2 weeks w/ daily BP and HR log

## 2019-08-20 NOTE — PROGRESS NOTES
"       Internal Medicine Interval Note  Note Author: Shelli Calvo M.D.     Name Carolina Hargrove     1950   Age/Sex 69 y.o. female   MRN [id number]   Code Status DNAR/DNI     After 5PM or if no immediate response to page, please call for cross-coverage  Attending/Team: Dr. Weinberg/ Beena See Patient List for primary contact information  Call [phone] to page    1st Call - Day Intern (R1):   Dr. Fernandez 2nd Call - Day Sr. Resident (R2/R3):   Dr. Calvo       Reason for interval visit  NSTEMI    Interval Problem Daily Status Update   Mrs Hargrove was admitted overnight for typical chest pain that resolved in the ED. Troponins initially undetectable and then trended upward quickly. No ST changes on EKG.  Cardiology recommended cath today, appreciate their recs. Patient is NPO. She received full dose ASA and weight based Lovenox last night, held this morning for procedure.    She is satting 98% on 2L NC. She denies history of COPD, home inhalers, or home oxygen. She smokes approximately 1/2 pack daily since age 20. She knows she should quit, but has had trouble doing so since her son  because she feels she \"has nothing left\".     Review of Systems   Constitutional: Negative for chills, fever and malaise/fatigue.   HENT: Negative for congestion, hearing loss and sore throat.    Eyes: Negative for blurred vision, double vision and photophobia.   Respiratory: Negative for cough, shortness of breath and wheezing.    Cardiovascular: Positive for chest pain (resolved) and palpitations. Negative for leg swelling.   Gastrointestinal: Negative for abdominal pain, blood in stool, constipation, diarrhea, nausea and vomiting.   Genitourinary: Negative for dysuria, frequency, hematuria and urgency.   Musculoskeletal: Negative for myalgias and neck pain.   Skin: Negative for itching and rash.   Neurological: Negative for dizziness, tremors and headaches.   Endo/Heme/Allergies: Negative for environmental allergies. Does " "not bruise/bleed easily.   Psychiatric/Behavioral: Negative for depression. The patient is not nervous/anxious.        Disposition/Barriers to discharge:   Requires acute care for acute myocardial infarction    Consultants/Specialty  Interventional Cardiology    PCP: No primary care provider on file.      Quality Measures  Quality-Core Measures   Reviewed items::  EKG reviewed, Radiology images reviewed, Labs reviewed and Medications reviewed  Carreno catheter::  No Carreno  DVT prophylaxis pharmacological::  Enoxaparin (Lovenox)  DVT prophylaxis - mechanical:  SCDs  Ulcer Prophylaxis::  Not indicated      Physical Exam     Vitals:    08/19/19 2001 08/19/19 2043 08/19/19 2358 08/20/19 0359   BP: (!) 171/73 153/73 145/65 150/71   Pulse: 77 69 65 67   Resp:  18 16 16   Temp:  36.7 °C (98.1 °F) 36.8 °C (98.3 °F) 36.2 °C (97.1 °F)   TempSrc:  Temporal Temporal Temporal   SpO2: 95% 92% 97% 97%   Weight:  104.1 kg (229 lb 8 oz)     Height:  1.727 m (5' 8\")       Body mass index is 34.9 kg/m². Weight: 104.1 kg (229 lb 8 oz)  Oxygen Therapy:  Pulse Oximetry: 97 %, O2 (LPM): 2, O2 Delivery: Nasal Cannula    Physical Exam   Constitutional: She is oriented to person, place, and time and well-developed, well-nourished, and in no distress.   HENT:   Head: Normocephalic and atraumatic.   Right Ear: External ear normal.   Left Ear: External ear normal.   Nose: Nose normal.   Mouth/Throat: No oropharyngeal exudate.   Eyes: Pupils are equal, round, and reactive to light. Conjunctivae are normal. No scleral icterus.   Neck: Normal range of motion. Neck supple. No thyromegaly present.   Cardiovascular: Normal rate, regular rhythm, normal heart sounds and intact distal pulses. Exam reveals no gallop and no friction rub.   No murmur heard.  Pulmonary/Chest: Effort normal and breath sounds normal. No respiratory distress. She has no wheezes. She has no rales.   On 2L NC   Abdominal: Soft. Bowel sounds are normal. She exhibits no distension. " There is no tenderness. There is no guarding.   Musculoskeletal: Normal range of motion. She exhibits no edema.   Lymphadenopathy:     She has no cervical adenopathy.   Neurological: She is alert and oriented to person, place, and time. No cranial nerve deficit.   Skin: Skin is warm and dry. She is not diaphoretic.   Psychiatric: Mood and affect normal.       Assessment/Plan     * NSTEMI (non-ST elevated myocardial infarction) (HCC)- (present on admission)  Assessment & Plan  Typical chest pain, now resolved. HEART score of 4 on presentation. ALBERT score 6, high risk of adverse outcome. Previous MI, CAD w/ stent x2, troponin trended upward from undetectable on presentation to 100s overnight. Cardiology called by night team after troponin elevation. ECG changes showed pathological q-wave. Received full dose ASA, beta blocker, statin, oxygen, weight based lovenox on admission. No morphine or nitro needed as pain has resolved.  -Proceed with cardiac catheterization per Interventional Cardiology, appreciate recs  -Lipid panel, Mg, Phos pending  -Dual antiplatelet therapy per Cardiology recs  -Continue home ASA, metoprolol, rosuvastatin    Hypertension- (present on admission)  Assessment & Plan  BP is 150/71. Uncontrolled.  -Continue home medications  -Hydralazine prn    Tobacco use disorder- (present on admission)  Assessment & Plan  Currently smoking. Contemplative.  -Nicotine replacement prn  - regarding smoking cessation prior to discharge    Hx of MI CAD s/p stent 3x- (present on admission)  Assessment & Plan  Pt has been compliant with medication mame ASA.  -Continue home medications, any acute changes per Cardiology recommendations    High hematocrit- (present on admission)  Assessment & Plan  Hct 48. Likely due to pt's current smoking. Can speculate this is EPO dependent.   -Recommend smoking cessation

## 2019-08-20 NOTE — ED PROVIDER NOTES
"ED Provider Note    Scribed for Robin Correa M.D. by Jeb Bhagat. 8/19/2019, 6:07 PM.    Primary care provider: None noted.  Means of arrival: Walk In  History obtained from: Patient  History limited by: None    CHIEF COMPLAINT  Chief Complaint   Patient presents with   • Chest Pain     \"I am having a heart attack, I know I am\". yesterday at 0530 am developed LT sided chest pain raidating down both arms.        HPI  Carolina Hargrove is a 69 y.o. Female with a history of hypertension, hyperlipidemia, and a previous MI who presents to the Emergency Department for evaluation of chest pain which onset yesterday at 5:30 AM. She states that she woke up in the morning with left sided chest pain radiating down her left arm and shortly afterwards it started to radiate down her right arm. Carolina claims that the pain feels the same as when she suffered her previous heart attack. Carolina took her nitroglycerin and aspirin after waking up and this slightly reduced her pain however it has persisted until this time. She currently describes her pain as a soreness and rates it a 5/10. She denies any shortness of breath, or nausea, and is unsure if she is undergoing any new or different episodes of diaphoresis as she claims she sweats frequently at baseline. Carolina has had three stents placed and continues to be an occassioanl smoker at this time.    Heart score is calculated to be 4.    REVIEW OF SYSTEMS  Pertinent positives include radiating left sided chest pain. Pertinent negatives include nausea, shortness of breath, diaphoresis. All other systems negative.    PAST MEDICAL HISTORY   has a past medical history of Current smoker, High cholesterol, Hypertension, Marijuana use, and MI (myocardial infarction) (HCC).    SURGICAL HISTORY  patient denies any surgical history    SOCIAL HISTORY  Social History     Tobacco Use   • Smoking status: Current Every Day Smoker     Types: Cigarettes   • Smokeless tobacco: Never Used " "  Substance Use Topics   • Alcohol use: Not Currently   • Drug use: Not Currently      Social History     Substance and Sexual Activity   Drug Use Not Currently       FAMILY HISTORY  History reviewed. No pertinent family history.    CURRENT MEDICATIONS  Home Medications     Reviewed by Majo Linn (Pharmacy Tech) on 08/19/19 at 1855  Med List Status: Complete   Medication Last Dose Status   aspirin EC (ECOTRIN) 81 MG Tablet Delayed Response 8/19/2019 Active   losartan-hydrochlorothiazide (HYZAAR) 100-12.5 MG per tablet 8/19/2019 Active   Magnesium 250 MG Tab 8/19/2019 Active   metoprolol (LOPRESSOR) 25 MG Tab 8/19/2019 Active   nitroglycerin (NITROSTAT) 0.4 MG SL Tab 8/19/2019 Active   rosuvastatin (CRESTOR) 20 MG Tab 8/18/2019 Active                ALLERGIES  Allergies   Allergen Reactions   • Pcn [Penicillins]      rash       PHYSICAL EXAM  VITAL SIGNS: /72   Pulse 63   Temp 36.6 °C (97.9 °F) (Temporal)   Resp 18   Ht 1.753 m (5' 9\")   Wt 105.3 kg (232 lb 2.3 oz)   SpO2 94%   BMI 34.28 kg/m²     Constitutional: Well developed, Well nourished, moderate distress.   Eyes: Conjunctiva normal, No discharge.   Cardiovascular: Normal heart rate, Normal rhythm, No murmurs, equal pulses.   Pulmonary: Normal breath sounds, No respiratory distress, No wheezing, No rales, No rhonchi.   Abdomen: Obese, Soft, No tenderness, No masses, no rebound, no guarding.   Musculoskeletal: No major deformities noted, No calf or leg tenderness, no edema.   Skin: Warm, Dry, No erythema, No rash.   Neurologic: Alert & oriented x 3, Normal motor function,  No focal deficits noted.   Psychiatric: Affect normal, Judgment normal, Mood normal.       LABS  Results for orders placed or performed during the hospital encounter of 08/19/19   CBC with Differential   Result Value Ref Range    WBC 8.3 4.8 - 10.8 K/uL    RBC 5.48 (H) 4.20 - 5.40 M/uL    Hemoglobin 15.5 12.0 - 16.0 g/dL    Hematocrit 48.4 (H) 37.0 - 47.0 %    MCV 88.3 " 81.4 - 97.8 fL    MCH 28.3 27.0 - 33.0 pg    MCHC 32.0 (L) 33.6 - 35.0 g/dL    RDW 42.6 35.9 - 50.0 fL    Platelet Count 241 164 - 446 K/uL    MPV 9.7 9.0 - 12.9 fL    Neutrophils-Polys 59.50 44.00 - 72.00 %    Lymphocytes 33.60 22.00 - 41.00 %    Monocytes 5.30 0.00 - 13.40 %    Eosinophils 1.00 0.00 - 6.90 %    Basophils 0.40 0.00 - 1.80 %    Immature Granulocytes 0.20 0.00 - 0.90 %    Nucleated RBC 0.00 /100 WBC    Neutrophils (Absolute) 4.92 2.00 - 7.15 K/uL    Lymphs (Absolute) 2.78 1.00 - 4.80 K/uL    Monos (Absolute) 0.44 0.00 - 0.85 K/uL    Eos (Absolute) 0.08 0.00 - 0.51 K/uL    Baso (Absolute) 0.03 0.00 - 0.12 K/uL    Immature Granulocytes (abs) 0.02 0.00 - 0.11 K/uL    NRBC (Absolute) 0.00 K/uL   Complete Metabolic Panel (CMP)   Result Value Ref Range    Sodium 142 135 - 145 mmol/L    Potassium 3.8 3.6 - 5.5 mmol/L    Chloride 106 96 - 112 mmol/L    Co2 27 20 - 33 mmol/L    Anion Gap 9.0 0.0 - 11.9    Glucose 93 65 - 99 mg/dL    Bun 13 8 - 22 mg/dL    Creatinine 0.84 0.50 - 1.40 mg/dL    Calcium 10.1 8.5 - 10.5 mg/dL    AST(SGOT) 18 12 - 45 U/L    ALT(SGPT) 21 2 - 50 U/L    Alkaline Phosphatase 58 30 - 99 U/L    Total Bilirubin 0.8 0.1 - 1.5 mg/dL    Albumin 4.7 3.2 - 4.9 g/dL    Total Protein 7.4 6.0 - 8.2 g/dL    Globulin 2.7 1.9 - 3.5 g/dL    A-G Ratio 1.7 g/dL   Troponin   Result Value Ref Range    Troponin T <6 6 - 19 ng/L   ESTIMATED GFR   Result Value Ref Range    GFR If African American >60 >60 mL/min/1.73 m 2    GFR If Non African American >60 >60 mL/min/1.73 m 2   TROPONIN   Result Value Ref Range    Troponin T 48 (H) 6 - 19 ng/L   EKG (NOW)   Result Value Ref Range    Report       [facility] Emergency Dept.    Test Date:  2019  Pt Name:    ANASTACIO CONNOR                Department: ER  MRN:        [id number]                      Room:       BL 15  Gender:     Female                       Technician: [id number]  :        1950                   Requested By:ER TRIAGE  PROTOCOL  Order #:    [id number]                    Reading MD: PATRICK EUCEDA MD    Measurements  Intervals                                Axis  Rate:       59                           P:          49  DE:         156                          QRS:        15  QRSD:       90                           T:          17  QT:         440  QTc:        436    Interpretive Statements  SINUS BRADYCARDIA  BORDERLINE INFERIOR Q WAVES  BASELINE WANDER IN LEAD(S) V6  No previous ECG available for comparison    Electronically Signed On 2019 18:07:55 PDT by PATRICK EUCEDA MD     EKG   Result Value Ref Range    Report       [facility]    Test Date:  2019  Pt Name:    ANASTACIO CONNOR                Department: CPU  MRN:        [id number]                      Room:       T215  Gender:     Female                       Technician: [id number]  :        1950                   Requested By:NELLIE COOLEY  Order #:    [id number]                    Reading MD:    Measurements  Intervals                                Axis  Rate:       53                           P:          52  DE:         161                          QRS:        11  QRSD:       103                          T:          17  QT:         458  QTc:        430    Interpretive Statements  SINUS BRADYCARDIA  RSR' IN V1 OR V2, RIGHT VCD OR RVH  INFERIOR INFARCT, OLD  Compared to ECG 2019 10:57:33  Right ventricular hypertrophy now present  RSR' in V1 or V2 now present  Myocardial infarct finding now present       All labs reviewed by me.    EKG  12 Lead EKG interpreted by me, see above.    RADIOLOGY  DX-CHEST-PORTABLE (1 VIEW)   Final Result      No evidence of acute cardiopulmonary process.        The radiologist's interpretation of all radiological studies have been reviewed by me.    COURSE & MEDICAL DECISION MAKING  Pertinent Labs & Imaging studies reviewed. (See chart for details)    6:07 PM - Patient seen and examined at bedside.  Patient will be treated with Zofran 4 mg and Nitroglycerin 2% ointment. Ordered DX-Chest 1 view, Estimated GFR, CBC with differential, Troponin, EKG to evaluate her symptoms. The differential diagnoses include but are not limited to: Myocardial infarction, atypical chest pain, unstable angina,  . Discussed with the patient that her troponin test is negative and her EKG is not concerning for a STEMI. However, given that she has significant risk factors for suffering an cardiac event estimated to be 10% chance in the next month, she was strongly advised to stay for overnight treatment, monitoring and a stress test. After a discussion was had and the risks of leaving were explained, she agrees to be admitted.    6:21 PM - Paged [facility]    6:32 PM - I spoke with [facility], who agrees to admit the patient    Medical Decision Making: At this point time I am concerned the patient has unstable angina.  Given her chest pain and cardiac disease I think she would benefit from admission with repeat enzymes and probable stress test.  Initial troponin is negative.  Patient is not hypoxic does not show any signs of a DVT I do not think she has a pulmonary embolism.  The pain is not ripping or tearing does not go to her back I do not think she has aortic dissection.    DISPOSITION:  Patient will be admitted to [facility], in guarded condition     FINAL IMPRESSION  1. Unstable angina pectoris (HCC)    2. Coronary artery disease involving native coronary artery of native heart, angina presence unspecified          Jeb ARROYO (Scribfredis), am scribing for, and in the presence of, Robin Correa M.D.    Electronically signed by: Jeb Bhagat (Wilfrid), 8/19/2019    Robin ARROYO M.D. personally performed the services described in this documentation, as scribed by Jeb Bhagat in my presence, and it is both accurate and complete. C.     The note accurately reflects work and decisions made by me.  Robin Correa   8/20/2019  2:10 AM

## 2019-08-20 NOTE — CONSULTS
Cardiology Consultation Note      Date of service: 8/20/2019      Requesting Physician: Dr. Conner Powers/[place]      Reason for consultation: NSTEMI      History of present illness    Mrs Hargrove is a 70 y/o female with known CAD, prior MI, s/p stenting of LCX and RCA in 2015 on ASA who was admitted here for yesterday AM chest pain.     She stated that 2 days ago around 5:30AM when she woke up with sharp substernal chest pain that radiates to her left and right arms, associated with sweating and dyspnea. It lasted about 15 minutes and subsided after aspirin and SL NTG. Yesterday morning, she kept having the same chest pain intermittently requiring multiple SL NTG. The symptoms are concernign to her because they are similar to when she had MI in 2015.     She has been relatively pain free here but serial troponin has become positive.    Allergies   Allergen Reactions   • Pcn [Penicillins]      rash       @HOMEMEDS    aspirin EC (ECOTRIN) 81 MG Tablet Delayed Response 8/19/2019 Active   losartan-hydrochlorothiazide (HYZAAR) 100-12.5 MG per tablet 8/19/2019 Active   Magnesium 250 MG Tab 8/19/2019 Active   metoprolol (LOPRESSOR) 25 MG Tab 8/19/2019 Active   nitroglycerin (NITROSTAT) 0.4 MG SL Tab 8/19/2019 Active   rosuvastatin (CRESTOR) 20 MG Tab           Current Facility-Administered Medications:   •  senna-docusate (PERICOLACE or SENOKOT S) 8.6-50 MG per tablet 2 Tab, 2 Tab, Oral, BID **AND** polyethylene glycol/lytes (MIRALAX) PACKET 1 Packet, 1 Packet, Oral, QDAY PRN **AND** magnesium hydroxide (MILK OF MAGNESIA) suspension 30 mL, 30 mL, Oral, QDAY PRN **AND** bisacodyl (DULCOLAX) suppository 10 mg, 10 mg, Rectal, QDAY PRN, Meaghan Murillo M.D.  •  labetalol (NORMODYNE,TRANDATE) injection 10 mg, 10 mg, Intravenous, Q4HRS PRN, Meaghan Murillo M.D.  •  aspirin EC (ECOTRIN) tablet 81 mg, 81 mg, Oral, DAILY, Meaghan Murillo M.D.  •  metoprolol (LOPRESSOR) tablet 12.5 mg, 12.5 mg, Oral,  BID, Meaghan Murillo M.D., 12.5 mg at 08/19/19 2103  •  rosuvastatin (CRESTOR) tablet 20 mg, 20 mg, Oral, Q EVENING, Meaghan Murillo M.D., 20 mg at 08/19/19 2103  •  losartan (COZAAR) tablet 100 mg, 100 mg, Oral, Q DAY, Meaghan Murillo M.D.  •  hydroCHLOROthiazide (HYDRODIURIL) tablet 12.5 mg, 12.5 mg, Oral, Q DAY, Meaghan Murillo M.D.  •  acetaminophen (TYLENOL) tablet 500 mg, 500 mg, Oral, Q6HRS PRN, Meaghan Murillo M.D.  •  enoxaparin (LOVENOX) inj 100 mg, 100 mg, Subcutaneous, Q12HRS, Meaghan Murillo M.D., 100 mg at 08/19/19 2323  •  nicotine (NICODERM) 7 MG/24HR 7 mg, 7 mg, Transdermal, Daily-0600, Conner Powers M.D.    Past Medical History:   Diagnosis Date   • Current smoker     smokes since 18 years old   • High cholesterol    • Hypertension    • Marijuana use    • MI (myocardial infarction) (HCC)     3 stents       History reviewed. No pertinent surgical history.    History reviewed. No pertinent family history.    Social History     Socioeconomic History   • Marital status:      Spouse name: Not on file   • Number of children: Not on file   • Years of education: Not on file   • Highest education level: Not on file   Occupational History   • Not on file   Social Needs   • Financial resource strain: Not on file   • Food insecurity:     Worry: Not on file     Inability: Not on file   • Transportation needs:     Medical: Not on file     Non-medical: Not on file   Tobacco Use   • Smoking status: Current Every Day Smoker     Types: Cigarettes   • Smokeless tobacco: Never Used   Substance and Sexual Activity   • Alcohol use: Not Currently   • Drug use: Not Currently   • Sexual activity: Not on file   Lifestyle   • Physical activity:     Days per week: Not on file     Minutes per session: Not on file   • Stress: Not on file   Relationships   • Social connections:     Talks on phone: Not on file     Gets together: Not on file      "Attends [employer] witness service: Not on file     Active member of club or organization: Not on file     Attends meetings of clubs or organizations: Not on file     Relationship status: Not on file   • Intimate partner violence:     Fear of current or ex partner: Not on file     Emotionally abused: Not on file     Physically abused: Not on file     Forced sexual activity: Not on file   Other Topics Concern   • Not on file   Social History Narrative   • Not on file         Review of systems;    General: No fever, chills, no recent weight change, no weakness or fatigue  HENT: No discharge, no ringing in the ears, no toothache or sore throat, no neck pain  Eyes: No redness, no blurred vision or double vision  Heart: No palpitation, no PND or orthopnea, no claudication, no leg swelling  Lung: No productive cough, no hemoptysis  Abdomen: No abdominal pain, no nausea vomiting or diarrhea, no blood in stool  : No dysuria, no frequency or hematuria  Musculoskeletal: No myalgia, no back pain, some joint pain  Hematology: No easy bruising  Skin: No rash or itching  Neurological: No headache, no new focal weakness or numbness  Psychological: + mental stress, denies depression, anxiety or insomnia  All other review of systems are negative    Vitals:    08/19/19 2001 08/19/19 2043 08/19/19 2358 08/20/19 0359   BP: (!) 171/73 153/73 145/65 150/71   Pulse: 77 69 65 67   Resp:  18 16 16   Temp:  36.7 °C (98.1 °F) 36.8 °C (98.3 °F) 36.2 °C (97.1 °F)   TempSrc:  Temporal Temporal Temporal   SpO2: 95% 92% 97% 97%   Weight:  104.1 kg (229 lb 8 oz)     Height:  1.727 m (5' 8\")       GENERAL not in acute distress, not dyspnic at rest  Head atraumatic, normocephalic  Eyes EOMI  ENT neck supple, no JVD, no carotid bruits or thyromegaly  Lung good expansion, distant sound, no rales or wheezing  Heart RRR, normal rate, no murmur, gallop or rub  Abd soft, no tenderness, mass or bruits  Ext no edema  Skin no ecchymosis or petechiae  Musculoskeletal " no deformity  Neuro grossly intact  Psych normal mood, normal affect    EKG on admission by my review showed slight horizontal ST depression in anterlateral leads which is still present on EKG this AM    Serial hs Tn T has risen to 164 ng/dL    CMP normal    CBC normal    Assessment and plans    1. NSTEMI  She has known CAD with multiple stents. EKG showed some changes laterally. I advised her to undergo cardiac catheterization and PCI as needed.  Risks and benefits of the procedure were discussed at length.   The patient understood, accepted the risks and wishes to proceed.     2. Tobacco abuse  Smoking cessation    3. Hypertension. BP somewhat high  Continue home meds but would increase the dose metoprolol    4. Hyperlipidemia  Check lipid, continue satin    Will follow the patient along with you.  Thank you consultation.    Please note that this dictation was created using voice recognition software. I have worked with consultants from the vendor as well as technical experts from [employer] to optimize the interface. I have made every reasonable attempt to correct obvious errors, but I expect that there are errors of grammar and possibly content I did not discover before finalizing the note

## 2019-08-20 NOTE — ASSESSMENT & PLAN NOTE
Pt has been compliant with medication mame ASA.  -Continue home medications, any acute changes per Cardiology recommendations

## 2019-08-20 NOTE — PROGRESS NOTES
Dr. Conner Powers notified of + trop of 164, pt. for cardiology consult- for [facility].

## 2019-08-20 NOTE — CARE PLAN
Problem: Safety  Goal: Will remain free from injury  Outcome: PROGRESSING AS EXPECTED   Anticipate patient's needs. Keep belongings within reach.  Problem: Pain Management  Goal: Pain level will decrease to patient's comfort goal  Outcome: PROGRESSING AS EXPECTED   Administer prescribed pain meds.

## 2019-08-20 NOTE — ASSESSMENT & PLAN NOTE
Currently smoking. Contemplative.  -Nicotine replacement prn  - regarding smoking cessation prior to discharge

## 2019-08-20 NOTE — PROGRESS NOTES
Transported  from blue  pod, aox4, sr on monitor, steady on her  feet. Denies pain or sob. Call light within reach. Needs attended. Plan of care discussed and understood.

## 2019-08-20 NOTE — ED NOTES
Med rec updated and complete. Allergies reviewed. Pt denies  antibiotic use in last 14 days. All morning doses taken.  Home pharmacy [facility].

## 2019-08-20 NOTE — PROCEDURES
"CARDIAC CATHETERIZATION REPORT    PROCEDURE PHYSICIAN: Dallas Chadwick MD, [facility], [facility]  ASSISTANT: None    IMPRESSIONS:  1. NSTEMI due to culprit mid LAD  2. Successful PCI of the mid LAD using one RADHA  3. Patent stents in the RCA and circumflex  4. Severe elevation in LVEDP (30 mmHg)  5. Normal LV systolic function    Recommendations:  DAPT x1 year, usual post MI care    Pre-procedure diagnosis: NSTEMI  Post-procedure diagnosis: same    Procedure performed  Selective coronary angiography  Left heart catheterization  Percutaneous coronary intervention (RADHA to the LAD)    Conscious sedation was supervised by myself and administered by trained personnel using fentanyl and versed between 1029 and 1119. The patient tolerated sedation without complication.     Procedure Description  1. Access: 6 Malay right radial artery Micropuncture technique following local anesthesia lidocaine. A radial cocktail was administered into the sheath.    2. Diagnostic description: The catheter was passed to the central circulation with the aide of J tipped 0.35\" wire. 5F TIG 4.0 was used to inject the coronary circulation and a 6F pigtail catheter was used to inject the left ventricle during invasive hemodynamic monitoring.     3. Description of Intervention: After confirming therapeutic anticoagulation intervention proceeded. A6 Malay EBU 3.5 guiding catheter was used. The lesion was crossed with a 0.014\" Prowater. The lesion was predilated with a 2.25 x 15 mm compliant balloon. Subsequently a 2.25 x 28 mm Xience Alpine drug eluting stent was deployed. I next post dilated the distal portion of the stent with a 2.5 x 15 mm NC balloon at 12 WILLI. The proximal stent was post dilated with a 3.25 x 8 mm NC balloon at 6 WILLI. Post procedure angiograms demonstrated good results    4. Hemostasis: Radial band     Findings   Hemodynamics: Aorta: 176/76 mmHg  LV: 176/30 mmHg    Coronary Anatomy   Left Main: Distal 10% stenosis   LAD: 30% stenosis " proximally. The mid vessel has a 70% stenosis. Diffuse luminal irregularities and negative remodeling of the distal LAD    LCx: 30% ostial stenosis, 20% mid stenosis followed by a widely patent stent extending into the OM1. the OM2 is small with proximal 30% stenosis   RCA: Dominant, Patent stent in the proximal through mid segment. Minimal luminal irregularities in the PDA, the rPLB is normal    Results of intervention:  Pre: 70% stenosis and ALBERT III flow  Post: 0% residual stenosis and ALBERT III flow. No dissection or distal embolization.    Left Ventriculography: LVEF 55%, normal wall motion. No MR. Normal ascending aorta    Technical Factors  1. Complications: None  2. Estimated Blood Loss: <50 cc  3. Specimens: None  4. Contrast Volume: 95 ml  5. Radiation: 555 mGy  6. Medications: Radial cocktail (Verapamil 2.5 mg, Nitroglycerin 100 mcg) Heparin to maintain ACT >250 Ticagrelor 180 mg

## 2019-08-21 ENCOUNTER — TELEPHONE (OUTPATIENT)
Dept: VASCULAR LAB | Facility: MEDICAL CENTER | Age: 69
End: 2019-08-21

## 2019-08-21 ENCOUNTER — APPOINTMENT (OUTPATIENT)
Dept: CARDIOLOGY | Facility: MEDICAL CENTER | Age: 69
DRG: 247 | End: 2019-08-21
Attending: INTERNAL MEDICINE
Payer: MEDICARE

## 2019-08-21 VITALS
DIASTOLIC BLOOD PRESSURE: 59 MMHG | RESPIRATION RATE: 18 BRPM | HEART RATE: 67 BPM | SYSTOLIC BLOOD PRESSURE: 132 MMHG | OXYGEN SATURATION: 96 % | BODY MASS INDEX: 34.75 KG/M2 | WEIGHT: 229.28 LBS | HEIGHT: 68 IN | TEMPERATURE: 97.7 F

## 2019-08-21 DIAGNOSIS — I21.4 NSTEMI (NON-ST ELEVATED MYOCARDIAL INFARCTION) (HCC): ICD-10-CM

## 2019-08-21 LAB
ANION GAP SERPL CALC-SCNC: 7 MMOL/L (ref 0–11.9)
BUN SERPL-MCNC: 12 MG/DL (ref 8–22)
CALCIUM SERPL-MCNC: 9.3 MG/DL (ref 8.5–10.5)
CHLORIDE SERPL-SCNC: 106 MMOL/L (ref 96–112)
CHOLEST SERPL-MCNC: 106 MG/DL (ref 100–199)
CO2 SERPL-SCNC: 26 MMOL/L (ref 20–33)
CREAT SERPL-MCNC: 0.83 MG/DL (ref 0.5–1.4)
ERYTHROCYTE [DISTWIDTH] IN BLOOD BY AUTOMATED COUNT: 42.4 FL (ref 35.9–50)
GLUCOSE SERPL-MCNC: 88 MG/DL (ref 65–99)
HCT VFR BLD AUTO: 43.6 % (ref 37–47)
HDLC SERPL-MCNC: 42 MG/DL
HGB BLD-MCNC: 13.9 G/DL (ref 12–16)
LDLC SERPL CALC-MCNC: 39 MG/DL
LV EJECT FRACT  99904: 70
LV EJECT FRACT MOD 2C 99903: 75
LV EJECT FRACT MOD 4C 99902: 72.27
LV EJECT FRACT MOD BP 99901: 72.78
MAGNESIUM SERPL-MCNC: 2.1 MG/DL (ref 1.5–2.5)
MCH RBC QN AUTO: 28.2 PG (ref 27–33)
MCHC RBC AUTO-ENTMCNC: 31.9 G/DL (ref 33.6–35)
MCV RBC AUTO: 88.4 FL (ref 81.4–97.8)
PHOSPHATE SERPL-MCNC: 3.7 MG/DL (ref 2.5–4.5)
PLATELET # BLD AUTO: 196 K/UL (ref 164–446)
PMV BLD AUTO: 9.6 FL (ref 9–12.9)
POTASSIUM SERPL-SCNC: 3.7 MMOL/L (ref 3.6–5.5)
RBC # BLD AUTO: 4.93 M/UL (ref 4.2–5.4)
SODIUM SERPL-SCNC: 139 MMOL/L (ref 135–145)
TRIGL SERPL-MCNC: 125 MG/DL (ref 0–149)
WBC # BLD AUTO: 7.8 K/UL (ref 4.8–10.8)

## 2019-08-21 PROCEDURE — 99406 BEHAV CHNG SMOKING 3-10 MIN: CPT | Performed by: INTERNAL MEDICINE

## 2019-08-21 PROCEDURE — 85027 COMPLETE CBC AUTOMATED: CPT

## 2019-08-21 PROCEDURE — 83735 ASSAY OF MAGNESIUM: CPT

## 2019-08-21 PROCEDURE — 700102 HCHG RX REV CODE 250 W/ 637 OVERRIDE(OP): Performed by: STUDENT IN AN ORGANIZED HEALTH CARE EDUCATION/TRAINING PROGRAM

## 2019-08-21 PROCEDURE — A9270 NON-COVERED ITEM OR SERVICE: HCPCS | Performed by: STUDENT IN AN ORGANIZED HEALTH CARE EDUCATION/TRAINING PROGRAM

## 2019-08-21 PROCEDURE — 84100 ASSAY OF PHOSPHORUS: CPT

## 2019-08-21 PROCEDURE — 93306 TTE W/DOPPLER COMPLETE: CPT | Mod: 26 | Performed by: INTERNAL MEDICINE

## 2019-08-21 PROCEDURE — A9270 NON-COVERED ITEM OR SERVICE: HCPCS | Performed by: INTERNAL MEDICINE

## 2019-08-21 PROCEDURE — 36415 COLL VENOUS BLD VENIPUNCTURE: CPT

## 2019-08-21 PROCEDURE — 700102 HCHG RX REV CODE 250 W/ 637 OVERRIDE(OP): Performed by: INTERNAL MEDICINE

## 2019-08-21 PROCEDURE — 93306 TTE W/DOPPLER COMPLETE: CPT

## 2019-08-21 PROCEDURE — 80061 LIPID PANEL: CPT

## 2019-08-21 PROCEDURE — 80048 BASIC METABOLIC PNL TOTAL CA: CPT

## 2019-08-21 PROCEDURE — 99239 HOSP IP/OBS DSCHRG MGMT >30: CPT | Mod: GC | Performed by: INTERNAL MEDICINE

## 2019-08-21 PROCEDURE — 99232 SBSQ HOSP IP/OBS MODERATE 35: CPT | Mod: 25 | Performed by: INTERNAL MEDICINE

## 2019-08-21 RX ORDER — CARVEDILOL 3.12 MG/1
3.12 TABLET ORAL 2 TIMES DAILY WITH MEALS
Status: DISCONTINUED | OUTPATIENT
Start: 2019-08-21 | End: 2019-08-21

## 2019-08-21 RX ADMIN — LOSARTAN POTASSIUM 100 MG: 50 TABLET ORAL at 06:17

## 2019-08-21 RX ADMIN — NICOTINE 7 MG: 7 PATCH, EXTENDED RELEASE TRANSDERMAL at 06:18

## 2019-08-21 RX ADMIN — METOPROLOL TARTRATE 25 MG: 25 TABLET, FILM COATED ORAL at 06:18

## 2019-08-21 RX ADMIN — TICAGRELOR 90 MG: 90 TABLET ORAL at 06:18

## 2019-08-21 RX ADMIN — ASPIRIN 81 MG: 81 TABLET ORAL at 06:17

## 2019-08-21 RX ADMIN — HYDROCHLOROTHIAZIDE 12.5 MG: 25 TABLET ORAL at 06:17

## 2019-08-21 ASSESSMENT — ENCOUNTER SYMPTOMS
DIZZINESS: 0
NERVOUS/ANXIOUS: 0
CONSTIPATION: 0
CHEST TIGHTNESS: 0
DEPRESSION: 0
ABDOMINAL PAIN: 0
FOCAL WEAKNESS: 0
HEADACHES: 0
CHILLS: 0
SHORTNESS OF BREATH: 0
FEVER: 0
PALPITATIONS: 0
DIARRHEA: 0
BLOOD IN STOOL: 0
VOMITING: 0
COUGH: 0
NAUSEA: 0

## 2019-08-21 NOTE — DISCHARGE SUMMARY
Internal Medicine Discharge Summary  Note Author: Mitchell Fernandez M.D.       Name Carolina Hargrove     1950   Age/Sex 69 y.o. female   MRN [id number]         Admit Date:  2019       Discharge Date:   2019    Service:   R Internal Medicine White Team  Attending Physician(s):   Tripp Weinberg MD       Senior Resident(s):   Shelli Ruggiero MD  Kyle Resident(s):   IRMA Fernandez MD  PCP: Dr. Pereira (General Cardiologist)    Primary Diagnosis:   Coronary Artery Disease  NSTEMI    Secondary Diagnoses:                Principal Problem:    NSTEMI (non-ST elevated myocardial infarction) (HCC) POA: Yes  Active Problems:    Hx of MI CAD s/p stent 3x POA: Yes    Tobacco use disorder POA: Yes    Hypertension POA: Yes    High hematocrit POA: Yes    Hospital Summary (Brief Narrative):       Ms. Hargrove is a pleasant 69 year old lady w/ a history of HTN, CAD, 50 pack year hx of smoking, and previous MI in 2015 s/p PCI w/ 3 stents (RCA,LCx) presented to [facility] ED on 2019 complaining of acute onset sharp substernal chest pain that radiated to both arms and jaw. Initial troponin T on admission was not elevated however EKG demonstrated pathological q waves in inferior leads II, III, and aVF. The patient was admitted to telemetry and managed w/ therapeutic Lovenox. Serial troponins trended up critically from 6 on admission to 164 less than 6 hours later and cardiology was consulted emergently. The patient underwent left heart catheterization on  and was found to have significant stenosis of the mLAD. PCI was performed w/ a RADHA placed in the mLAD. The patient was loaded w/ Ticagrelor and transferred back to telemetry. She had no acute events on the monitor overnight. On  she underwent routine post-PCI transthoracic echo and demonstrated preserved ejection fraction. At this point, the patient was hemodynamically stable and safe for discharge home w/ outpatient cardiology follow up.     Patient  /Hospital Summary (Details -- Problem Oriented) :          * NSTEMI (non-ST elevated myocardial infarction) (HCC)  Assessment & Plan  Previous MI in 2015 s/p stent x2 (RCA and LCx).   Presented 8/19 w/ typical chest pain but non-elevated troponin T  Initial EKG w/ pathological q-waves in inferior leads  Received full dose ASA, beta blocker, statin, oxygen, weight based lovenox on admission.   Troponin T elevated critically.   Evaluated by interventional cardiology.  Underwent left heart catheterization on 8/20, found to have significant stenosis of mLAD and nonobstructive LM, pLAD, and pLCx disease w/ patent LCx and RCA stents. Drug eluting stent placed in mLAD. LVEF noted to be 55%. Loaded w/ 180mg of Ticagrelor in the cath lab.     Plan  - Continue w/ DAPT for 1 year: ASA 81mg + Ticagrelor 90mg Daily  - Continue w/ rosuvastatin 20mg Daily  - Blood pressure control  - Smoking cessation counseling provided  - Outpatient cardiology follow up in 2 weeks    Hypertension  Assessment & Plan  Chronic  Found to have elevated LVEDP on cardiac cath    Plan:  - Decrease preload: home lopressor increased from 12.5mg BID to 25mg BID. Inpatient Cardiology (Dr England) recommends outpatient transitioning to Carvedilol 3.125mg by patient's cardiology (Dr. Pereira)  - Continue w/ Losaratan/HCTZ  - Outpatient cardiology follow up in 2 weeks w/ daily BP and HR log    Tobacco use disorder  Assessment & Plan  Currently smoking. Contemplative.  -Nicotine replacement PRN  -Smoking cessation counseling provided    High hematocrit  Assessment & Plan  Hct 48 on admission  Likely secondary to tobacco smoking/ EPO dependent.   -Smoking cessation counseling provided    Consultants:     Interventional Cardiology    Procedures:        Left heart catheterization     Imaging/ Testing:      Coronary angiogram    Discharge Medications:         Medication Reconciliation: Completed       Medication List      START taking these medications       Instructions   ticagrelor 90 MG Tabs tablet  Commonly known as:  BRILINTA   Doctor's comments:  Meds to Bed. This rx was submitted by a pharmacist working under a collaborative practice agreement.  Take 1 Tab by mouth 2 Times a Day.  Dose:  90 mg        CHANGE how you take these medications      Instructions   metoprolol 25 MG Tabs  What changed:    · how much to take  · when to take this  Commonly known as:  LOPRESSOR   Take 1 Tab by mouth 2 Times a Day.  Dose:  25 mg        CONTINUE taking these medications      Instructions   aspirin EC 81 MG Tbec  Commonly known as:  ECOTRIN   Take 81 mg by mouth every day.  Dose:  81 mg     losartan-hydrochlorothiazide 100-12.5 MG per tablet  Commonly known as:  HYZAAR   Take 1 Tab by mouth every day.  Dose:  1 Tab     Magnesium 250 MG Tabs   Take 250 mg by mouth. Indications: every other day  Dose:  250 mg     nitroglycerin 0.4 MG Subl  Commonly known as:  NITROSTAT   Place 0.4 mg under tongue every 5 minutes as needed for Chest Pain.  Dose:  0.4 mg     rosuvastatin 20 MG Tabs  Commonly known as:  CRESTOR   Take 20 mg by mouth every evening.  Dose:  20 mg          Disposition:   Home    Diet:   Heart Health Diet    Activity:  No restrictions, as safely tolerated    Instructions:      The patient was instructed to return to the ER in the event of chest pain, chest discomfort, shortness of breath, or diaphoresis. I have counseled the patient on the importance of compliance and the patient has agreed to proceed with all medical recommendations and follow up plan indicated above.   The patient understands that all medications come with benefits and risks. Risks may include permanent injury or death and these risks can be minimized with close reassessment and monitoring.        Primary Care Provider:    Moe Michaud MD  Discharge summary faxed to primary care provider:  Completed  Copy of discharge summary given to the patient: Completed      Follow up appointment details :       PRIMARY CARE PROVIDER    In 2 weeks  PLEASE BE SURE TO FOLLOW UP WITH YOUR PROVIDER AS SOON AS YOU ARE BACK HOME IN [place]. THANK YOU    Dr. Josefa Pereira    In 2 weeks      Pending Studies:        None    Time spent on discharge day patient visit, preparing discharge paperwork and arranging for patient follow up.    Summary of follow up issues:   None    Discharge Time (Minutes) :    45 mins  Hospital Course Type: Inpatient Stay < 2 midnights, patient recovered more rapidly than anticipated      Condition on Discharge    ______________________________________________________________________    Interval history/exam for day of discharge:    POD 1 S/P PCI W/ MLAD RADHA   No acute events overnight  Sinus rhythm on monitor  No complaints.  No chest pain.  No shortness of breath.  No anxiety or discomfort.  No fevers or chills.   Vital signs stable.    Exam:  PLEASANT ELDERLY LADY LAYING IN BED IN NO ACUTE DISTRESS  HEENT, AT, NC  HEART SOUNDS NORMAL, NO MURMURS, PALPABLE DISTAL PULSES  CTAB  ABD S, NT, ND  R RADIAL ACCESS SITE COVERED W/ CLEAN DRESSING, NO SIGNS OF HEMATOMA OR BLEEDING  ALL FOUR EXTREMITIES SOFT AND WELL PERFUSED     Most Recent Labs:    Lab Results   Component Value Date/Time    WBC 7.8 08/21/2019 02:59 AM    RBC 4.93 08/21/2019 02:59 AM    HEMOGLOBIN 13.9 08/21/2019 02:59 AM    HEMATOCRIT 43.6 08/21/2019 02:59 AM    MCV 88.4 08/21/2019 02:59 AM    MCH 28.2 08/21/2019 02:59 AM    MCHC 31.9 (L) 08/21/2019 02:59 AM    MPV 9.6 08/21/2019 02:59 AM    NEUTSPOLYS 45.30 08/20/2019 04:15 AM    LYMPHOCYTES 44.30 (H) 08/20/2019 04:15 AM    MONOCYTES 7.80 08/20/2019 04:15 AM    EOSINOPHILS 2.00 08/20/2019 04:15 AM    BASOPHILS 0.30 08/20/2019 04:15 AM      Lab Results   Component Value Date/Time    SODIUM 139 08/21/2019 02:59 AM    POTASSIUM 3.7 08/21/2019 02:59 AM    CHLORIDE 106 08/21/2019 02:59 AM    CO2 26 08/21/2019 02:59 AM    GLUCOSE 88 08/21/2019 02:59 AM    BUN 12 08/21/2019 02:59 AM    CREATININE  0.83 08/21/2019 02:59 AM      Lab Results   Component Value Date/Time    ALTSGPT 17 08/20/2019 04:15 AM    ASTSGOT 22 08/20/2019 04:15 AM    ALKPHOSPHAT 51 08/20/2019 04:15 AM    TBILIRUBIN 0.8 08/20/2019 04:15 AM    ALBUMIN 3.8 08/20/2019 04:15 AM    GLOBULIN 2.3 08/20/2019 04:15 AM     No results found for: PROTHROMBTM, INR

## 2019-08-21 NOTE — DISCHARGE PLANNING
Meds to Bed  Received call from JENIFER Scott in cardiology.  Brilinta 90mg Take 1 BID #60 RF 11.   Pt to be discharged later today, post Echo.   Sent Rx to [facility] for processing.      Delfina Williamson, PharmD

## 2019-08-21 NOTE — CARE PLAN
Problem: Knowledge Deficit  Goal: Knowledge of the prescribed therapeutic regimen will improve  Outcome: PROGRESSING AS EXPECTED   Pt updated on POC, tests, and medications. Pt verbalizes understanding and has no further questions at this time. Pt educated on calling for any more questions.     Problem: Discharge Barriers/Planning  Goal: Patient's continuum of care needs will be met  Outcome: PROGRESSING AS EXPECTED   RN will assess potential discharge barriers throughout patient's hospital stay.

## 2019-08-21 NOTE — PROGRESS NOTES
Patient discharged home with family. A&Ox4. IV's taken out, RN provided wheelchair and hospital escort. Monitor taken off; monitor room notified. Patient belongings discharged with patient. Discharge summary done with patient. RN provided education regarding follow up care, appointments, and medications. Rn  Also provided education regarding when to call doctor and when to call 911.

## 2019-08-21 NOTE — DISCHARGE PLANNING
[facility]    MEDS TO BEDS     DELIVERED TO PATIENT IN ROOM    Brilinta 90mg #60 provided at $0 copay.     Pt education provided, all questions answered, including but not limited to potential side effects, what to do if a dose is missed, when to contact his physician.  Pt verbalized understanding to take this medication twice daily along with an 81mg ASA, with or without regard to food,  for a duration of 12 months unless otherwise directed by cardiology.  Pt understands to begin this medication AFTER discharge from hospital. Pt verbalized understanding that she must refill this medication at his local drug outlet.     All questions answered.  Pt verbalized understanding including when to return to the ED.  Cj Hammer, PharmD

## 2019-08-21 NOTE — TELEPHONE ENCOUNTER
Meds to Bed  Received call from JENIFER Scott in cardiology.  Brilinta 90mg Take 1 BID #60 RF 11.   Pt to be discharged later today, post Echo.   Sent Rx to [facility] for processing.     Delfina Williamson, PharmD

## 2019-08-21 NOTE — PROGRESS NOTES
Cardiology Follow Up Progress Note    Date of Service  8/21/2019    Attending Physician  Tripp Weinberg M.D.    Chief Complaint   Chest pain    Cardiology consulted for chest pain with elevated troponin.    HPI  Carolina Hargrove is a 69 y.o. female admitted 8/19/2019 with NSTEMI.  Patient was taken for coronary angiography on 8/20/2019 which showed 70% mid LAD stenosis and patent LCx and RCA stents.    Past medical history is for CAD with stenting of LCx and RCA in 2015 and hypertension.    Interim Events  8/21/19: Denies any recurrence of chest pain overnight or with ambulation.  Meds to bed requested for Brilinta.  Echocardiogram pending.  No significant events overnight.    Monitor: SR 60-78 with occasional PVC and rare PAC.    Review of Systems  Review of Systems   Constitutional: Negative for fever.   Respiratory: Negative for chest tightness and shortness of breath.    Cardiovascular: Negative for chest pain, palpitations and leg swelling.   Gastrointestinal: Negative for abdominal pain and blood in stool.   Genitourinary: Negative for hematuria.   Musculoskeletal: Negative for gait problem.   Neurological: Negative for dizziness and syncope.   All other systems reviewed and are negative.      Vital signs in last 24 hours  Temp:  [36.3 °C (97.4 °F)-37.1 °C (98.8 °F)] 36.5 °C (97.7 °F)  Pulse:  [58-73] 67  Resp:  [12-18] 18  BP: (112-168)/(59-91) 132/59  SpO2:  [94 %-98 %] 96 %    Physical Exam  Physical Exam   Constitutional: She is oriented to person, place, and time. She appears well-developed and well-nourished.   HENT:   Head: Normocephalic.   Eyes: EOM are normal.   Neck: No JVD present.   Cardiovascular: Normal rate, regular rhythm, normal heart sounds and intact distal pulses.   Pulmonary/Chest: Effort normal and breath sounds normal.   Abdominal: Soft. There is no tenderness.   Musculoskeletal: Normal range of motion. She exhibits no edema.   Neurological: She is alert and oriented to person, place,  and time.   Skin: Skin is warm and dry.   Right radial cath site is uncomplicated with intact circulation/sensation.   Psychiatric: She has a normal mood and affect. Her behavior is normal. Thought content normal.   Nursing note and vitals reviewed.      Lab Review  Lab Results   Component Value Date/Time    WBC 7.8 08/21/2019 02:59 AM    RBC 4.93 08/21/2019 02:59 AM    HEMOGLOBIN 13.9 08/21/2019 02:59 AM    HEMATOCRIT 43.6 08/21/2019 02:59 AM    MCV 88.4 08/21/2019 02:59 AM    MCH 28.2 08/21/2019 02:59 AM    MCHC 31.9 (L) 08/21/2019 02:59 AM    MPV 9.6 08/21/2019 02:59 AM      Lab Results   Component Value Date/Time    SODIUM 139 08/21/2019 02:59 AM    POTASSIUM 3.7 08/21/2019 02:59 AM    CHLORIDE 106 08/21/2019 02:59 AM    CO2 26 08/21/2019 02:59 AM    GLUCOSE 88 08/21/2019 02:59 AM    BUN 12 08/21/2019 02:59 AM    CREATININE 0.83 08/21/2019 02:59 AM      Lab Results   Component Value Date/Time    ASTSGOT 22 08/20/2019 04:15 AM    ALTSGPT 17 08/20/2019 04:15 AM     Lab Results   Component Value Date/Time    CHOLSTRLTOT 106 08/21/2019 02:59 AM    LDL 39 08/21/2019 02:59 AM    HDL 42 08/21/2019 02:59 AM    TRIGLYCERIDE 125 08/21/2019 02:59 AM    TROPONINT 164 (H) 08/20/2019 04:15 AM       No results for input(s): NTPROBNP in the last 72 hours.    Cardiac Imaging and Procedures Review  Echocardiogram: Pending    Cardiac Catheterization: 8/21/19  IMPRESSIONS:  1. NSTEMI due to culprit mid LAD  2. Successful PCI of the mid LAD using one RADHA  3. Patent stents in the RCA and circumflex  4. Severe elevation in LVEDP (30 mmHg)  5. Normal LV systolic function      Assessment/Plan  NSTEMI:  -Trop peak of 164.  -S/P PCI with RADHA to mid LAD, cath showed patent LCx and RCA stents.   -LDL this am was 39, at goal of <70 with CAD.   -Repeat Echo pending.  -DAPT (ASA 81 mg daily and Brilinta 90 mg BID) for minimum of one year.  -Meds to bed requested.  -Continue Losartan 100 mg daily and Lopressor 25 mg  BID.    Hypertension:  -Stable.  -Continue Losartan and Lopressor as above.     Tobacco Use:  -Patient counseled.     Thank you for allowing us to participate in the care of this patient.  Patient is stable from cardiology perspective for discharge once echocardiogram and meds to beds completed.  She will follow-up with her established cardiologist in [facility].    FRANDY Chambers.   [facility]  [phone]

## 2019-08-21 NOTE — DISCHARGE INSTRUCTIONS
Discharge Instructions    Discharged to home by car with relative. Discharged via wheelchair, hospital escort: Yes.  Special equipment needed: Not Applicable    Be sure to schedule a follow-up appointment with your primary care doctor or any specialists as instructed.     Discharge Plan:   Diet Plan: Discussed  Activity Level: Discussed  Smoking Cessation Offered: Patient Counseled  Confirmed Follow up Appointment: Patient to Call and Schedule Appointment  Confirmed Symptoms Management: Discussed  Medication Reconciliation Updated: Yes  Influenza Vaccine Indication: Indicated: Not available from distributor/    I understand that a diet low in cholesterol, fat, and sodium is recommended for good health. Unless I have been given specific instructions below for another diet, I accept this instruction as my diet prescription.   Other diet: heart healthy    Special Instructions: Diagnosis:  Acute Coronary Syndrome (ACS) is a diagnosis that encompasses cardiac-related chest pain and heart attack. ACS occurs when the blood flow to the heart muscle is severely reduced or cut off completely due to a slow process called atherosclerosis.  Atherosclerosis is a disease in which the coronary arteries become narrow from a buildup of fat, cholesterol, and other substances that combine to form plaque. If the plaque breaks, a blood clot will form and block the blood flow to the heart muscle. This lack of blood flow can cause damage or death to the heart muscle which is called a heart attack or Myocardial Infarction (MI). There are two different types of MIs:  ST Elevation Myocardial Infarction or STEMI (the most severe type of heart attack) and Non-ST Elevation Myocardial Infarction or NSTEMI.    Treatment Plan:  · Cardiac Diet  - Low fat, low salt, low cholesterol   · Cardiac Rehab  - Your doctor has ordered you a referral to [facility].  Call [phone] to schedule an appointment.  · Attend my follow-up appointment with  my Cardiologist.  · Take my medications as prescribed by my doctor  · Exercise daily  · Reduce stress    Medications:  Certain medications are used to treat ACS.  Remember to always take medications as prescribed and never stop talking medications unless told by your doctor.    You have been prescribed the following medicatons:    Anti-platelet/blood thinner - Your Anti-platelet/Blood thinning medication is called Brilinta, and is used in combination with aspirin to prevent clots from forming in your heart and/or around your stent.  Your doctor will determine how long you need to be on this medicine.    · Is patient discharged on Warfarin / Coumadin?   No   Ticagrelor oral tablet  What is this medicine?  TICAGRELOR (RUDI ka GREL or) helps to prevent blood clots. This medicine is used to prevent heart attack, stroke, or other vascular events in people who have had a recent heart attack or who have severe chest pain.  This medicine may be used for other purposes; ask your health care provider or pharmacist if you have questions.  COMMON BRAND NAME(S): BRILINTA  What should I tell my health care provider before I take this medicine?  They need to know if you have any of these conditions:  -bleeding disorders  -bleeding in the brain  -having surgery  -history of irregular heartbeat  -history of stomach bleeding  -liver disease  -an unusual or allergic reaction to ticagrelor, other medicines, foods, dyes, or preservatives  -pregnant or trying to get pregnant  -breast-feeding  How should I use this medicine?  Take this medicine by mouth with a glass of water. Follow the directions on the prescription label. You can take it with or without food. If it upsets your stomach, take it with food. Take your medicine at regular intervals. Do not take it more often than directed. Do not stop taking except on your doctor's advice.  Talk to you pediatrician regarding the use of this medicine in children. Special care may be  needed.  Overdosage: If you think you have taken too much of this medicine contact a poison control center or emergency room at once.  NOTE: This medicine is only for you. Do not share this medicine with others.  What if I miss a dose?  If you miss a dose, take it as soon as you can. If it is almost time for your next dose, take only that dose. Do not take double or extra doses.  What may interact with this medicine?  -certain antibiotics like clarithromycin and telithromycin  -certain medicines for fungal infections like itraconazole, ketoconazole, and voriconazole  -certain medicines for HIV infection like atazanavir, indinavir, nelfinavir, ritonavir, and saquinavir  -certain medicines for seizures like carbamazepine, phenobarbital, and phenytoin  -certain medicines that treat or prevent blood clots like warfarin  -dexamethasone  -digoxin  -lovastatin  -nefazodone  -rifampin  -simvastatin  This list may not describe all possible interactions. Give your health care provider a list of all the medicines, herbs, non-prescription drugs, or dietary supplements you use. Also tell them if you smoke, drink alcohol, or use illegal drugs. Some items may interact with your medicine.  What should I watch for while using this medicine?  Visit your doctor or health care professional for regular check ups. Do not stop taking you medicine unless your doctor tells you to.  Notify your doctor or health care professional and seek emergency treatment if you develop breathing problems; changes in vision; chest pain; severe, sudden headache; pain, swelling, warmth in the leg; trouble speaking; sudden numbness or weakness of the face, arm, or leg. These can be signs that your condition has gotten worse.  If you are going to have surgery or dental work, tell your doctor or health care professional that you are taking this medicine.  You should take aspirin every day with this medicine. Do not take more than 100 mg each day. Talk to your  doctor if you have questions.  What side effects may I notice from receiving this medicine?  Side effects that you should report to your doctor or health care professional as soon as possible:  -allergic reactions like skin rash, itching or hives, swelling of the face, lips, or tongue  -breathing problems  -fast or irregular heartbeat  -feeling faint or light-headed, falls  -signs and symptoms of bleeding such as bloody or black, tarry stools; red or dark-brown urine; spitting up blood or brown material that looks like coffee grounds; red spots on the skin; unusual bruising or bleeding from the eye, gums, or nose  Side effects that usually do not require medical attention (report to your doctor or health care professional if they continue or are bothersome):  -breast enlargement in both males and females  -diarrhea  -dizziness  -headache  -tiredness  -upset stomach  This list may not describe all possible side effects. Call your doctor for medical advice about side effects. You may report side effects to FDA at 5-181-RKY-1342.  Where should I keep my medicine?  Keep out of the reach of children.  Store at room temperature of 59 to 86 degrees F (15 to 30 degrees C). Throw away any unused medicine after the expiration date.  NOTE: This sheet is a summary. It may not cover all possible information. If you have questions about this medicine, talk to your doctor, pharmacist, or health care provider.  © 2018 [employer]vier/Gold Standard (2017-01-19 11:53:14)      Depression / Suicide Risk    As you are discharged from this [facility] facility, it is important to learn how to keep safe from harming yourself.    Recognize the warning signs:  · Abrupt changes in personality, positive or negative- including increase in energy   · Giving away possessions  · Change in eating patterns- significant weight changes-  positive or negative  · Change in sleeping patterns- unable to sleep or sleeping all the time   · Unwillingness or  inability to communicate  · Depression  · Unusual sadness, discouragement and loneliness  · Talk of wanting to die  · Neglect of personal appearance   · Rebelliousness- reckless behavior  · Withdrawal from people/activities they love  · Confusion- inability to concentrate     If you or a loved one observes any of these behaviors or has concerns about self-harm, here's what you can do:  · Talk about it- your feelings and reasons for harming yourself  · Remove any means that you might use to hurt yourself (examples: pills, rope, extension cords, firearm)  · Get professional help from the community (Mental Health, Substance Abuse, psychological counseling)  · Do not be alone:Call your Safe Contact- someone whom you trust who will be there for you.  · Call your local CRISIS HOTLINE 763-0370 or 580-670-7029  · Call your local Children's Mobile Crisis Response Team [place] (447) 815-3728 or www.[employer]  · Call the toll free National Suicide Prevention Hotlines   · National Suicide Prevention Lifeline 990-864-PDTB (2349)  · [employer] Hope Line Network 800-SUICIDE (725-0293)    Coronary Angiogram With Stent, Care After  Refer to this sheet in the next few weeks. These instructions provide you with information about caring for yourself after your procedure. Your health care provider may also give you more specific instructions. Your treatment has been planned according to current medical practices, but problems sometimes occur. Call your health care provider if you have any problems or questions after your procedure.  WHAT TO EXPECT AFTER THE PROCEDURE   After your procedure, it is typical to have the following:  · Bruising at the catheter insertion site that usually fades within 1-2 weeks.  · Blood collecting in the tissue (hematoma) that may be painful to the touch. It should usually decrease in size and tenderness within 1-2 weeks.  HOME CARE INSTRUCTIONS  · Take medicines only as directed by your health  care provider. Blood thinners may be prescribed after your procedure to improve blood flow through the stent.  · You may shower 24-48 hours after the procedure or as directed by your health care provider. Remove the bandage (dressing) and gently wash the catheter insertion site with plain soap and water. Pat the area dry with a clean towel. Do not rub the site, because this may cause bleeding.  · Do not take baths, swim, or use a hot tub until your health care provider approves.  · Check your catheter insertion site every day for redness, swelling, or drainage.  · Do not apply powder or lotion to the site.  · Do not lift over 10 lb (4.5 kg) for 5 days after your procedure or as directed by your health care provider.  · Ask your health care provider when it is okay to:  ¨ Return to work or school.  ¨ Resume usual physical activities or sports.  ¨ Resume sexual activity.  · Eat a heart-healthy diet. This should include plenty of fresh fruits and vegetables. Meat should be lean cuts. Avoid the following types of food:  ¨ Food that is high in salt.  ¨ Canned or highly processed food.  ¨ Food that is high in saturated fat or sugar.  ¨ Fried food.  · Make any other lifestyle changes as recommended by your health care provider. These may include:  ¨ Not using any tobacco products, including cigarettes, chewing tobacco, or electronic cigarettes. If you need help quitting, ask your health care provider.  ¨ Managing your weight.  ¨ Getting regular exercise.  ¨ Managing your blood pressure.  ¨ Limiting your alcohol intake.  ¨ Managing other health problems, such as diabetes.  · If you need an MRI after your heart stent has been placed, be sure to tell the health care provider who orders the MRI that you have a heart stent.  · Keep all follow-up visits as directed by your health care provider. This is important.  SEEK MEDICAL CARE IF:  · You have a fever.  · You have chills.  · You have increased bleeding from the catheter  insertion site. Hold pressure on the site.  SEEK IMMEDIATE MEDICAL CARE IF:  · You develop chest pain or shortness of breath, feel faint, or pass out.  · You have unusual pain at the catheter insertion site.  · You have redness, warmth, or swelling at the catheter insertion site.  · You have drainage (other than a small amount of blood on the dressing) from the catheter insertion site.  · The catheter insertion site is bleeding, and the bleeding does not stop after 30 minutes of holding steady pressure on the site.  · You develop bleeding from any other place, such as from your rectum. There may be bright red blood in your urine or stool, or it may appear as black, tarry stool.     This information is not intended to replace advice given to you by your health care provider. Make sure you discuss any questions you have with your health care provider.     Document Released: 07/07/2006 Document Revised: 01/08/2016 Document Reviewed: 07/06/2015  [employer] Interactive Patient Education ©2016 [employer]

## 2019-08-21 NOTE — PROGRESS NOTES
Bedside report received. Patient resting in bed. RN assessed pain; nonverbal pain scale indicates no assumed pain present at this time. Call light within reach. Need for bed alarm assessed; patient is at no risk for falls, precautions in place as appropriate.

## 2019-08-21 NOTE — PROGRESS NOTES
Assumed care of PT A&O 4. Pt resting in bed with no signs of labored breathing. On RA. Tele monitor in place, cardiac rhythm being monitored. Call light within reach, bed in lowest position, upper bed rails up. Family at bedside. Pt was updated on plan of care for the day. Will continue to monitor.

## 2019-08-21 NOTE — CARE PLAN
Problem: Safety  Goal: Will remain free from falls  Outcome: PROGRESSING AS EXPECTED  Note:   Pt mobility assessed at beginning of shift. Pt is standby assist. Fall precautions in place. Non-slip socks on. Bed in lowest locked position. Bed alarm on. Call light within reach. Pt educated to call for assistance and verbalizes understanding.       Problem: Infection  Goal: Will remain free from infection  Outcome: PROGRESSING AS EXPECTED  Note:   Pt educated on importance of hand hygiene and oral care. Standard precautions in place.

## 2019-08-21 NOTE — PROGRESS NOTES
Cardiovascular Nurse Navigator ([phone]) Note:    NSTEMI with PCI yesterday. EF is 55% per left ventriculography. JENIFER Alba has already initiated meds to beds for ticagrelor (Thank you, Maura!). Bedside nursing to please ensure that the meds are delivered before pt leaves hospital.    Call [phone] (or, if after hours/weekend, [phone] and request 'on-call anti-coag pharmacist') patient should have med in hand at time of discharge.    Reviewed ACS medications:  · DAPT: aspirin + ticagrelor  · Beta-Blocker:  lopressor  · Statin:  rosuvastatin   · Consider for aldosterone blockade?  no -- EF is 55%  · Consider for ACE-I/ARB/ARNI?  Patient is on losartan    Meds to Beds BEDSIDE NURSING RESPONSIBILITIES:    Please initiate Meds to Beds for dual antiplatelet therapy:     1. Obtain outpatient order for P2Y12 inhibitor (ticagrelor, clopidogrel, prasugrel) from physician   2. Call [phone] (or, if after hours/weekend, [phone] and request 'on-call anti-coag pharmacist') patient should have med in hand at time of discharge.    Intensive Cardiac Rehab (ICR) Referral:  Referred on 8/20/19; has current inpatient orders for nutrition consult & PT for Phase I ICR    Demographics  Patient resides in: [place], [place]  Insurance: Medicare    Inpatient & Discharge Patient Education:  Bedside nursing to continually provide patient education on ACS meds, signs and symptoms to monitor for, and risk factor modification.     Also at discharge please complete the “Acute Coronary Syndrome” special instructions on the AVS.          Thank you and please call with questions.

## 2019-08-21 NOTE — NON-PROVIDER
Internal Medicine Interval Note  Note Author: Peter Pérez, Student     Name Carolina Hargrove     1950   Age/Sex 69 y.o. female   MRN [id number]   Code Status DNAR/DNI     After 5PM or if no immediate response to page, please call for cross-coverage  Attending/Team: Sultana/Beena See Patient List for primary contact information  Call [phone] to page    1st Call - Day Intern (R1):    2nd Call - Day Sr. Resident (R2/R3):   Umesh         Reason for interval visit  (Principal Problem)   NSTEMI s/p x1 RADHA placement in mid LAD post-procedure day 1       Interval Problem Daily Status Update  (24 hours, problem oriented, brief subjective history, new lab/imaging data pertinent to that problem)   Patient was sitting up in bed, visiting with her nurse when I entered the room. She seemed calm, comfortable and in no acute distress. She reported no complaints. She denied any chest pain, shortness of breath, headaches, fever or chills. She only mentioned feeling a little nauseous when taking her medications but this is not new for her. She feels ready to go home today after her echocardiogram is completed. She understood the side effects of Brilanta and is amenable to that addition to her heart medications. On exam, vital signs are stable except for slightly elevated /72. Physical exam reveals RRR on cardiac auscultation, lungs that are CTAB and a radial artery access site that is clean, dry and intact. Radial pulses are 2+ bilaterally. CBC, CMP and lipid panels are all normal. Coronary angiography yesterday showed 55% ejection fraction and 70% occlusion of the mid LAD, and a RADHA was placed percutaneously with no complications. Echocardiogram is pending.     Review of Systems   Constitutional: Negative for chills and fever.   Respiratory: Negative for cough and shortness of breath.    Cardiovascular: Negative for chest pain and leg swelling.   Gastrointestinal: Negative for abdominal pain, blood in  stool, constipation, diarrhea, nausea and vomiting.   Genitourinary: Negative for dysuria, frequency, hematuria and urgency.   Neurological: Negative for focal weakness and headaches.   Psychiatric/Behavioral: Negative for depression. The patient is not nervous/anxious.        Disposition/Barriers to discharge:   Will discharge today after echocardiogram complete    Consultants/Specialty  Dallas Chadwick, Interventional Cardiology    PCP: No primary care provider on file.      Quality Measures  Quality-Core Measures   DVT prophylaxis pharmacological::  Enoxaparin (Lovenox)          Physical Exam       Vitals:    08/20/19 1845 08/21/19 0330 08/21/19 0900 08/21/19 0933   BP: 112/69 147/72  132/59   Pulse: 71 63 67    Resp: 18 16 18    Temp: 36.3 °C (97.4 °F) 37.1 °C (98.8 °F) 36.5 °C (97.7 °F)    TempSrc:   Temporal    SpO2: 94% 98% 96%    Weight: 104 kg (229 lb 4.5 oz)      Height:         Body mass index is 34.86 kg/m². Weight: 104 kg (229 lb 4.5 oz)  Oxygen Therapy:  Pulse Oximetry: 96 %, O2 (LPM): 0, O2 Delivery: None (Room Air)    Physical Exam   Constitutional: She is oriented to person, place, and time. No distress.   HENT:   Head: Normocephalic and atraumatic.   Eyes: Pupils are equal, round, and reactive to light. EOM are normal.   Neck: Normal range of motion. Neck supple.   Cardiovascular: Normal rate, regular rhythm, normal heart sounds and intact distal pulses.   Pulmonary/Chest: Effort normal and breath sounds normal. No respiratory distress.   Abdominal: Soft. Bowel sounds are normal. She exhibits no distension. There is no tenderness.   Musculoskeletal: Normal range of motion.   Neurological: She is alert and oriented to person, place, and time.   Skin: Skin is warm and dry.   Psychiatric: Mood and affect normal.             Assessment/Plan     1. NSTEMI - chest pain typical for ACS, resolved. S/p x1 RADHA placed in mid LAD. Access site clean, dry and intact. No signs or symptoms of infection,  thromboembolism or radial artery dissection as well as no signs of active bleeding.   - Chest pain resolved, no need for NTG or morphine at this time   - O2 sat 98% on RA, no need for supplemental O2 at this time  - Cardiac catheterization has been performed, cleared to go home once medically stable; appreciate procedure and assessment  - Lipid panel ordered, per cardio rec; normal  - DAPT per cardio rec (aspirin 81mg, ticagrelor 90mg PO BID); will send home with Rolf (new); side effects were discussed with patient  - Continue home ASA, rosuvastatin  - Increased dose of metoprolol from 25 mg to 50 mg, per cardio rec; appreciate consult     2. Hypertension (present on admission) - /72, should decrease with increased dose of metoprolol  - Continue home medications, including HCTZ 12.5 mg and losartan 100 mg     3. Tobacco use disorder (present on admission) - currently smoking, contemplative  - Nicotine replacement PRN  - Smoking cessation counseling prior to discharge     4. Hx of MI CAD s/p stent 3x (present on admission) - Pt has been compliant with medication, especially ASA  - Continue home meds, adjust per cardio rec's     5. High hematocrit (present on admission) - Hct 48. Likely due to current smoking, possibly dehydrated.  - Recommend smoking cessation

## 2021-10-19 NOTE — NON-PROVIDER
"       Internal Medicine Interval Note  Note Author: Peter Pérez, Student     Name Carolina Hargrove     1950   Age/Sex 69 y.o. female   MRN [id number]   Code Status DNAR/DNI     After 5PM or if no immediate response to page, please call for cross-coverage  Attending/Team: Trevor See Patient List for primary contact information  Call [phone] to page    1st Call - Day Intern (R1):    2nd Call - Day Sr. Resident (R2/R3):   Umesh         Reason for interval visit  (Principal Problem)   Chest pain      Interval Problem Daily Status Update  (24 hours, problem oriented, brief subjective history, new lab/imaging data pertinent to that problem)   Carolina Hargrove is a pleasant 70 yo female with a history of CAD and prior MI s/p 3 stents placed in LCX and RCA in  on aspirin 81 mg, who presented to ED yesterday with substernal chest pain that radiated to her jaw and both arms and was not relieved with sublingual nitroglycerin. She said it felt like when she had a heart attack \"last time.\" On exam, vitals were stable except for /94. Labs showed Troponin T 164. 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    Overnight, patient reported feeling well. She denied feeling any chest pain. She denied feeling any headaches, cough, shortness of breath or swelling in her legs. She reported a 25 pack year smoking and not taking \"good care\" of herself since the passing of her son 13 years ago. On exam, vitals are stable. Patient is pleasant and appears comfortable in bed. Lungs are clear to auscultation bilaterally, including at the bases. Cardiac auscultation reveals RRR and no murmurs, rubs or gallops. There is no peripheral edema noted. Labs show Troponin T 164 and according to repeat EKG, the old inferior infarct and RVH are no longer present, but there is a possible atypical RBBB.    Review of Systems   Constitutional: Negative for chills and " "fever.   Respiratory: Negative for cough and shortness of breath.    Cardiovascular: Negative for chest pain and leg swelling.   Gastrointestinal: Negative for nausea and vomiting.   Neurological: Negative for focal weakness.   Psychiatric/Behavioral: Negative for depression. The patient is not nervous/anxious.          Disposition/Barriers to discharge:   Inpatient for possible stent placement    Consultants/Specialty  Domo [facility], Interventional Cardiology    PCP: No primary care provider on file.      Quality Measures  Quality-Core Measures   Reviewed items::  EKG reviewed, Labs reviewed, Medications reviewed and Radiology images reviewed          Physical Exam       Vitals:    08/19/19 2043 08/19/19 2358 08/20/19 0359 08/20/19 0849   BP: 153/73 145/65 150/71 131/72   Pulse: 69 65 67 68   Resp: 18 16 16 18   Temp: 36.7 °C (98.1 °F) 36.8 °C (98.3 °F) 36.2 °C (97.1 °F) 37 °C (98.6 °F)   TempSrc: Temporal Temporal Temporal Temporal   SpO2: 92% 97% 97% 98%   Weight: 104.1 kg (229 lb 8 oz)      Height: 1.727 m (5' 8\")        Body mass index is 34.9 kg/m². Weight: 104.1 kg (229 lb 8 oz)  Oxygen Therapy:  Pulse Oximetry: 98 %, O2 (LPM): 2, O2 Delivery: Nasal Cannula    Physical Exam   Constitutional: She is oriented to person, place, and time. No distress.   HENT:   Head: Normocephalic and atraumatic.   Eyes: Pupils are equal, round, and reactive to light. EOM are normal.   Neck: Normal range of motion. Neck supple.   Cardiovascular: Normal rate, regular rhythm and normal heart sounds. Exam reveals no gallop and no friction rub.   No murmur heard.  Pulmonary/Chest: Effort normal and breath sounds normal. No respiratory distress.   Abdominal: Soft. Bowel sounds are normal. She exhibits no distension. There is no tenderness.   Musculoskeletal: Normal range of motion.   Neurological: She is alert and oriented to person, place, and time.   Psychiatric: Mood, memory, affect and judgment normal. "         Assessment/Plan     Assessment & plan notes cannot be loaded without a specified hospital service.    1. NSTEMI - chest pain typical for ACS, resolved. HEART score 4 on presentation. ALBERT score, high risk of adverse outcome. On admission, received full dose ASA, metoprolol, rosuvastatin, weight-based lovenox. No morphine or nitro needed for pain as it had resolved.   - Chest pain resolved, no need for NTG or morphine at this time   - O2 sat 94% on RA, no need for supplemental O2 at this time  - Cardiac catheterization has been performed  - Lipid panel ordered, per cardio rec  - DAPT per cardio rec (aspirin 81mg, ticagrelor 90mg PO BID)  - Continue home ASA, rosuvastatin  - Increase dose of metoprolol from 25 mg to 50 mg per cardio rec    2. Hypertension (present on admission) - /72, resolved  - Continue home medications, including HCTZ 12.5 mg and losartan 100 mg    3. Tobacco use disorder (present on admission) - currently smoking, contemplative  - Nicotine replacement PRN  - Smoking cessation counseling prior to discharge    4. Hx of MI CAD s/p stent 3x (present on admission) - Pt has been compliant with medication, especially ASA  - Continue home meds, adjust per cardio rec's    5. High hematocrit (present on admission) - Hct 48. Likely due to current smoking, possibly dehydrated.  - Recommend smoking cessation   Opt out

## 2023-06-01 NOTE — PROGRESS NOTES
Troponin of 48, [facility] paged.   Picato Counseling:  I discussed with the patient the risks of Picato including but not limited to erythema, scaling, itching, weeping, crusting, and pain.